# Patient Record
Sex: FEMALE | Race: WHITE | NOT HISPANIC OR LATINO | Employment: FULL TIME | URBAN - METROPOLITAN AREA
[De-identification: names, ages, dates, MRNs, and addresses within clinical notes are randomized per-mention and may not be internally consistent; named-entity substitution may affect disease eponyms.]

---

## 2022-09-28 ENCOUNTER — HOSPITAL ENCOUNTER (OUTPATIENT)
Dept: ULTRASOUND IMAGING | Facility: CLINIC | Age: 22
Discharge: HOME | End: 2022-09-28
Attending: OBSTETRICS & GYNECOLOGY | Admitting: OBSTETRICS & GYNECOLOGY
Payer: COMMERCIAL

## 2022-09-28 DIAGNOSIS — E04.9 ENLARGED THYROID: ICD-10-CM

## 2022-09-28 PROCEDURE — 76536 US EXAM OF HEAD AND NECK: CPT

## 2022-10-03 ENCOUNTER — HEALTH MAINTENANCE LETTER (OUTPATIENT)
Age: 22
End: 2022-10-03

## 2022-12-28 LAB
HEPATITIS B SURFACE ANTIGEN (EXTERNAL): NEGATIVE
HIV1+2 AB SERPL QL IA: NONREACTIVE
RUBELLA ANTIBODY IGG (EXTERNAL): NORMAL
TREPONEMA PALLIDUM ANTIBODY (EXTERNAL): NONREACTIVE

## 2023-03-16 ENCOUNTER — TRANSFERRED RECORDS (OUTPATIENT)
Dept: HEALTH INFORMATION MANAGEMENT | Facility: CLINIC | Age: 23
End: 2023-03-16
Payer: COMMERCIAL

## 2023-03-16 ENCOUNTER — MEDICAL CORRESPONDENCE (OUTPATIENT)
Dept: HEALTH INFORMATION MANAGEMENT | Facility: CLINIC | Age: 23
End: 2023-03-16
Payer: COMMERCIAL

## 2023-03-16 ENCOUNTER — TRANSCRIBE ORDERS (OUTPATIENT)
Dept: ULTRASOUND IMAGING | Facility: CLINIC | Age: 23
End: 2023-03-16
Payer: COMMERCIAL

## 2023-03-16 DIAGNOSIS — O26.90 PREGNANCY RELATED CONDITION, ANTEPARTUM: Primary | ICD-10-CM

## 2023-03-17 ENCOUNTER — PRE VISIT (OUTPATIENT)
Dept: MATERNAL FETAL MEDICINE | Facility: CLINIC | Age: 23
End: 2023-03-17
Payer: COMMERCIAL

## 2023-03-22 ENCOUNTER — HOSPITAL ENCOUNTER (OUTPATIENT)
Dept: ULTRASOUND IMAGING | Facility: CLINIC | Age: 23
Discharge: HOME | End: 2023-03-22
Attending: OBSTETRICS & GYNECOLOGY
Payer: COMMERCIAL

## 2023-03-22 ENCOUNTER — OFFICE VISIT (OUTPATIENT)
Dept: MATERNAL FETAL MEDICINE | Facility: CLINIC | Age: 23
End: 2023-03-22
Attending: OBSTETRICS & GYNECOLOGY
Payer: COMMERCIAL

## 2023-03-22 DIAGNOSIS — O99.212 OBESITY COMPLICATING PREGNANCY, SECOND TRIMESTER: Primary | ICD-10-CM

## 2023-03-22 DIAGNOSIS — O26.90 PREGNANCY RELATED CONDITION, ANTEPARTUM: ICD-10-CM

## 2023-03-22 PROCEDURE — 76811 OB US DETAILED SNGL FETUS: CPT

## 2023-03-22 PROCEDURE — 76811 OB US DETAILED SNGL FETUS: CPT | Mod: 26 | Performed by: OBSTETRICS & GYNECOLOGY

## 2023-03-22 NOTE — PROGRESS NOTES
Please see the imaging tab for details of the ultrasound performed today.    Elizabeth Ga MD  Specialist in Maternal-Fetal Medicine

## 2023-05-22 ENCOUNTER — HOSPITAL ENCOUNTER (INPATIENT)
Facility: CLINIC | Age: 23
LOS: 6 days | Discharge: HOME | End: 2023-05-28
Attending: SPECIALIST | Admitting: SPECIALIST
Payer: COMMERCIAL

## 2023-05-22 DIAGNOSIS — O14.23 HELLP SYNDROME (HELLP), THIRD TRIMESTER: ICD-10-CM

## 2023-05-22 PROBLEM — E66.9 OBESITY: Status: ACTIVE | Noted: 2023-05-22

## 2023-05-22 PROBLEM — O14.13 PREECLAMPSIA, SEVERE, THIRD TRIMESTER: Status: ACTIVE | Noted: 2023-05-22

## 2023-05-22 LAB
ABO/RH(D): NORMAL
ALBUMIN SERPL BCG-MCNC: 2.9 G/DL (ref 3.5–5.2)
ALP SERPL-CCNC: 141 U/L (ref 35–104)
ALT SERPL W P-5'-P-CCNC: 107 U/L (ref 10–35)
ALT SERPL W P-5'-P-CCNC: 82 U/L (ref 10–35)
ANION GAP SERPL CALCULATED.3IONS-SCNC: 9 MMOL/L (ref 7–15)
ANTIBODY SCREEN: NEGATIVE
AST SERPL W P-5'-P-CCNC: 51 U/L (ref 10–35)
AST SERPL W P-5'-P-CCNC: 62 U/L (ref 10–35)
AST SERPL W P-5'-P-CCNC: 70 U/L (ref 10–35)
BILIRUB SERPL-MCNC: 0.2 MG/DL
BUN SERPL-MCNC: 9.9 MG/DL (ref 6–20)
CALCIUM SERPL-MCNC: 8.6 MG/DL (ref 8.6–10)
CHLORIDE SERPL-SCNC: 106 MMOL/L (ref 98–107)
CREAT SERPL-MCNC: 0.81 MG/DL (ref 0.51–0.95)
DEPRECATED HCO3 PLAS-SCNC: 20 MMOL/L (ref 22–29)
ERYTHROCYTE [DISTWIDTH] IN BLOOD BY AUTOMATED COUNT: 13.9 % (ref 10–15)
GFR SERPL CREATININE-BSD FRML MDRD: >90 ML/MIN/1.73M2
GLUCOSE SERPL-MCNC: 102 MG/DL (ref 70–99)
HCT VFR BLD AUTO: 35.3 % (ref 35–47)
HGB BLD-MCNC: 12.3 G/DL (ref 11.7–15.7)
HGB BLD-MCNC: 13.1 G/DL (ref 11.7–15.7)
MCH RBC QN AUTO: 32 PG (ref 26.5–33)
MCHC RBC AUTO-ENTMCNC: 34.8 G/DL (ref 31.5–36.5)
MCV RBC AUTO: 92 FL (ref 78–100)
PLATELET # BLD AUTO: 304 10E3/UL (ref 150–450)
PLATELET # BLD AUTO: 315 10E3/UL (ref 150–450)
POTASSIUM SERPL-SCNC: 4.3 MMOL/L (ref 3.4–5.3)
PROT SERPL-MCNC: 5.8 G/DL (ref 6.4–8.3)
RBC # BLD AUTO: 3.84 10E6/UL (ref 3.8–5.2)
SODIUM SERPL-SCNC: 135 MMOL/L (ref 136–145)
SPECIMEN EXPIRATION DATE: NORMAL
WBC # BLD AUTO: 15.2 10E3/UL (ref 4–11)

## 2023-05-22 PROCEDURE — 250N000011 HC RX IP 250 OP 636

## 2023-05-22 PROCEDURE — 250N000013 HC RX MED GY IP 250 OP 250 PS 637

## 2023-05-22 PROCEDURE — 80053 COMPREHEN METABOLIC PANEL: CPT | Performed by: SPECIALIST

## 2023-05-22 PROCEDURE — 84156 ASSAY OF PROTEIN URINE: CPT | Performed by: SPECIALIST

## 2023-05-22 PROCEDURE — 86780 TREPONEMA PALLIDUM: CPT | Performed by: SPECIALIST

## 2023-05-22 PROCEDURE — 85018 HEMOGLOBIN: CPT | Performed by: SPECIALIST

## 2023-05-22 PROCEDURE — 99232 SBSQ HOSP IP/OBS MODERATE 35: CPT | Performed by: NURSE PRACTITIONER

## 2023-05-22 PROCEDURE — G0463 HOSPITAL OUTPT CLINIC VISIT: HCPCS | Mod: 25

## 2023-05-22 PROCEDURE — 87653 STREP B DNA AMP PROBE: CPT | Performed by: SPECIALIST

## 2023-05-22 PROCEDURE — 86850 RBC ANTIBODY SCREEN: CPT | Performed by: SPECIALIST

## 2023-05-22 PROCEDURE — 59025 FETAL NON-STRESS TEST: CPT

## 2023-05-22 PROCEDURE — 96372 THER/PROPH/DIAG INJ SC/IM: CPT | Performed by: SPECIALIST

## 2023-05-22 PROCEDURE — 250N000011 HC RX IP 250 OP 636: Performed by: SPECIALIST

## 2023-05-22 PROCEDURE — 84460 ALANINE AMINO (ALT) (SGPT): CPT | Performed by: SPECIALIST

## 2023-05-22 PROCEDURE — 250N000013 HC RX MED GY IP 250 OP 250 PS 637: Performed by: SPECIALIST

## 2023-05-22 PROCEDURE — 36415 COLL VENOUS BLD VENIPUNCTURE: CPT | Performed by: SPECIALIST

## 2023-05-22 PROCEDURE — 84450 TRANSFERASE (AST) (SGOT): CPT | Performed by: SPECIALIST

## 2023-05-22 PROCEDURE — 258N000003 HC RX IP 258 OP 636: Performed by: SPECIALIST

## 2023-05-22 PROCEDURE — 120N000001 HC R&B MED SURG/OB

## 2023-05-22 PROCEDURE — 96365 THER/PROPH/DIAG IV INF INIT: CPT

## 2023-05-22 PROCEDURE — 85027 COMPLETE CBC AUTOMATED: CPT | Performed by: SPECIALIST

## 2023-05-22 PROCEDURE — 85049 AUTOMATED PLATELET COUNT: CPT | Performed by: SPECIALIST

## 2023-05-22 PROCEDURE — 999N000105 HC STATISTIC NO DOCUMENTATION TO SUPPORT CHARGE

## 2023-05-22 RX ORDER — HYDROXYZINE HYDROCHLORIDE 50 MG/1
50 TABLET, FILM COATED ORAL DAILY PRN
Status: DISCONTINUED | OUTPATIENT
Start: 2023-05-22 | End: 2023-05-28 | Stop reason: HOSPADM

## 2023-05-22 RX ORDER — MAGNESIUM SULFATE HEPTAHYDRATE 40 MG/ML
INJECTION, SOLUTION INTRAVENOUS
Status: COMPLETED
Start: 2023-05-22 | End: 2023-05-22

## 2023-05-22 RX ORDER — OXYCODONE HYDROCHLORIDE 5 MG/1
5 TABLET ORAL EVERY 6 HOURS PRN
Status: DISCONTINUED | OUTPATIENT
Start: 2023-05-22 | End: 2023-05-23

## 2023-05-22 RX ORDER — NALOXONE HYDROCHLORIDE 0.4 MG/ML
0.2 INJECTION, SOLUTION INTRAMUSCULAR; INTRAVENOUS; SUBCUTANEOUS
Status: DISCONTINUED | OUTPATIENT
Start: 2023-05-22 | End: 2023-05-28 | Stop reason: HOSPADM

## 2023-05-22 RX ORDER — NIFEDIPINE 10 MG/1
10-20 CAPSULE ORAL
Status: DISCONTINUED | OUTPATIENT
Start: 2023-05-22 | End: 2023-05-28 | Stop reason: HOSPADM

## 2023-05-22 RX ORDER — LIDOCAINE 40 MG/G
CREAM TOPICAL
Status: DISCONTINUED | OUTPATIENT
Start: 2023-05-22 | End: 2023-05-28 | Stop reason: HOSPADM

## 2023-05-22 RX ORDER — MAGNESIUM SULFATE IN WATER 40 MG/ML
2 INJECTION, SOLUTION INTRAVENOUS CONTINUOUS
Status: DISCONTINUED | OUTPATIENT
Start: 2023-05-22 | End: 2023-05-28 | Stop reason: HOSPADM

## 2023-05-22 RX ORDER — MAGNESIUM SULFATE HEPTAHYDRATE 40 MG/ML
4 INJECTION, SOLUTION INTRAVENOUS ONCE
Status: COMPLETED | OUTPATIENT
Start: 2023-05-22 | End: 2023-05-22

## 2023-05-22 RX ORDER — LABETALOL HYDROCHLORIDE 5 MG/ML
20-80 INJECTION, SOLUTION INTRAVENOUS EVERY 10 MIN PRN
Status: DISCONTINUED | OUTPATIENT
Start: 2023-05-22 | End: 2023-05-28 | Stop reason: HOSPADM

## 2023-05-22 RX ORDER — HYDROXYZINE HYDROCHLORIDE 25 MG/1
25 TABLET, FILM COATED ORAL DAILY PRN
Status: DISCONTINUED | OUTPATIENT
Start: 2023-05-22 | End: 2023-05-28 | Stop reason: HOSPADM

## 2023-05-22 RX ORDER — ACETAMINOPHEN 325 MG/1
650 TABLET ORAL EVERY 6 HOURS PRN
Status: ON HOLD | COMMUNITY
End: 2023-05-28

## 2023-05-22 RX ORDER — CALCIUM GLUCONATE 94 MG/ML
1 INJECTION, SOLUTION INTRAVENOUS
Status: DISCONTINUED | OUTPATIENT
Start: 2023-05-22 | End: 2023-05-28 | Stop reason: HOSPADM

## 2023-05-22 RX ORDER — NIFEDIPINE 10 MG/1
CAPSULE ORAL
Status: COMPLETED
Start: 2023-05-22 | End: 2023-05-22

## 2023-05-22 RX ORDER — NALOXONE HYDROCHLORIDE 0.4 MG/ML
0.4 INJECTION, SOLUTION INTRAMUSCULAR; INTRAVENOUS; SUBCUTANEOUS
Status: DISCONTINUED | OUTPATIENT
Start: 2023-05-22 | End: 2023-05-28 | Stop reason: HOSPADM

## 2023-05-22 RX ORDER — PRENATAL VIT/IRON FUM/FOLIC AC 27MG-0.8MG
1 TABLET ORAL DAILY
COMMUNITY

## 2023-05-22 RX ORDER — SODIUM CHLORIDE, SODIUM LACTATE, POTASSIUM CHLORIDE, CALCIUM CHLORIDE 600; 310; 30; 20 MG/100ML; MG/100ML; MG/100ML; MG/100ML
INJECTION, SOLUTION INTRAVENOUS CONTINUOUS
Status: DISCONTINUED | OUTPATIENT
Start: 2023-05-22 | End: 2023-05-28 | Stop reason: HOSPADM

## 2023-05-22 RX ORDER — MAGNESIUM SULFATE HEPTAHYDRATE 40 MG/ML
2 INJECTION, SOLUTION INTRAVENOUS
Status: DISCONTINUED | OUTPATIENT
Start: 2023-05-22 | End: 2023-05-28 | Stop reason: HOSPADM

## 2023-05-22 RX ORDER — LABETALOL HYDROCHLORIDE 5 MG/ML
20 INJECTION, SOLUTION INTRAVENOUS
Status: DISCONTINUED | OUTPATIENT
Start: 2023-05-22 | End: 2023-05-28 | Stop reason: HOSPADM

## 2023-05-22 RX ORDER — MAGNESIUM SULFATE HEPTAHYDRATE 40 MG/ML
4 INJECTION, SOLUTION INTRAVENOUS
Status: DISCONTINUED | OUTPATIENT
Start: 2023-05-22 | End: 2023-05-28 | Stop reason: HOSPADM

## 2023-05-22 RX ORDER — NIFEDIPINE 30 MG/1
30 TABLET, EXTENDED RELEASE ORAL 3 TIMES DAILY
Status: DISCONTINUED | OUTPATIENT
Start: 2023-05-22 | End: 2023-05-23

## 2023-05-22 RX ORDER — NIFEDIPINE 30 MG/1
30 TABLET, EXTENDED RELEASE ORAL 2 TIMES DAILY
Status: DISCONTINUED | OUTPATIENT
Start: 2023-05-22 | End: 2023-05-22

## 2023-05-22 RX ORDER — BETAMETHASONE SODIUM PHOSPHATE AND BETAMETHASONE ACETATE 3; 3 MG/ML; MG/ML
12 INJECTION, SUSPENSION INTRA-ARTICULAR; INTRALESIONAL; INTRAMUSCULAR; SOFT TISSUE EVERY 24 HOURS
Status: COMPLETED | OUTPATIENT
Start: 2023-05-22 | End: 2023-05-23

## 2023-05-22 RX ORDER — LABETALOL HYDROCHLORIDE 5 MG/ML
INJECTION, SOLUTION INTRAVENOUS
Status: COMPLETED
Start: 2023-05-22 | End: 2023-05-22

## 2023-05-22 RX ADMIN — LABETALOL HYDROCHLORIDE 40 MG: 5 INJECTION, SOLUTION INTRAVENOUS at 15:31

## 2023-05-22 RX ADMIN — NIFEDIPINE 10 MG: 10 CAPSULE ORAL at 14:05

## 2023-05-22 RX ADMIN — MAGNESIUM SULFATE HEPTAHYDRATE 4 G: 40 INJECTION, SOLUTION INTRAVENOUS at 14:32

## 2023-05-22 RX ADMIN — NIFEDIPINE 20 MG: 10 CAPSULE ORAL at 14:47

## 2023-05-22 RX ADMIN — MAGNESIUM SULFATE IN WATER 2 G/HR: 40 INJECTION, SOLUTION INTRAVENOUS at 15:11

## 2023-05-22 RX ADMIN — LABETALOL HYDROCHLORIDE 20 MG: 5 INJECTION, SOLUTION INTRAVENOUS at 15:08

## 2023-05-22 RX ADMIN — SODIUM CHLORIDE, POTASSIUM CHLORIDE, SODIUM LACTATE AND CALCIUM CHLORIDE: 600; 310; 30; 20 INJECTION, SOLUTION INTRAVENOUS at 15:14

## 2023-05-22 RX ADMIN — LABETALOL HYDROCHLORIDE 20 MG: 5 INJECTION INTRAVENOUS at 15:08

## 2023-05-22 RX ADMIN — BETAMETHASONE SODIUM PHOSPHATE AND BETAMETHASONE ACETATE 12 MG: 3; 3 INJECTION, SUSPENSION INTRA-ARTICULAR; INTRALESIONAL; INTRAMUSCULAR at 14:48

## 2023-05-22 RX ADMIN — OXYCODONE HYDROCHLORIDE 5 MG: 5 TABLET ORAL at 16:17

## 2023-05-22 RX ADMIN — NIFEDIPINE 20 MG: 10 CAPSULE ORAL at 14:26

## 2023-05-22 RX ADMIN — OXYCODONE HYDROCHLORIDE 5 MG: 5 TABLET ORAL at 22:18

## 2023-05-22 RX ADMIN — NIFEDIPINE 30 MG: 30 TABLET, FILM COATED, EXTENDED RELEASE ORAL at 20:04

## 2023-05-22 ASSESSMENT — ACTIVITIES OF DAILY LIVING (ADL)
FALL_HISTORY_WITHIN_LAST_SIX_MONTHS: NO
TOILETING_ISSUES: NO
VISION_MANAGEMENT: GLASES
ADLS_ACUITY_SCORE: 20
DRESSING/BATHING_DIFFICULTY: NO
HEARING_DIFFICULTY_OR_DEAF: NO
ADLS_ACUITY_SCORE: 31
DOING_ERRANDS_INDEPENDENTLY_DIFFICULTY: NO
ADLS_ACUITY_SCORE: 20
CHANGE_IN_FUNCTIONAL_STATUS_SINCE_ONSET_OF_CURRENT_ILLNESS/INJURY: NO
DIFFICULTY_COMMUNICATING: NO
ADLS_ACUITY_SCORE: 31
WEAR_GLASSES_OR_BLIND: YES
CONCENTRATING,_REMEMBERING_OR_MAKING_DECISIONS_DIFFICULTY: NO
DIFFICULTY_EATING/SWALLOWING: NO
WALKING_OR_CLIMBING_STAIRS_DIFFICULTY: NO
ADLS_ACUITY_SCORE: 31

## 2023-05-22 NOTE — PLAN OF CARE
Goal Outcome Evaluation:         SBAR report received from Erika VIRK RN. Will assume all cares for this patient.

## 2023-05-22 NOTE — PROVIDER NOTIFICATION
05/22/23 1556   Provider Notification   Provider Name/Title Dr Hicks   Method of Notification In Department;At Bedside   Request Evaluate in Person   Notification Reason Status Update     Dr Hicks remained either at patients bedside, or at the desk in MAC to continuously assess patients BP and plan of care. Updates given throughout the entire time patient was in MAC regarding BPs and how patient responding to medications.

## 2023-05-22 NOTE — PLAN OF CARE
Goal Outcome Evaluation:         Updated  via telephone regarding lab/urine results. No new orders. Will continue with plan of care.

## 2023-05-22 NOTE — PLAN OF CARE
Goal Outcome Evaluation:                at 30 0/7 weeks gestation presents via wheelchair with Dr. Hicks from office with elevated blood pressure and headache. Patient started noticing a headache on Saturday, tried taking tylenol without any relief. Patient had noticed increased swelling in bilat lower legs, face and hands. External monitors applied after verbal consent by patient. Vital signs taken and set to cycle per protocol. Admission database obtained and prenatal record reviewed. Dr. Hicks at bedside for position ultrasound. Patient and spouse oriented to room, call light and plan of care.

## 2023-05-22 NOTE — H&P
"OB ADMISSION NOTE    CHIEF COMPLAINT: Brought over from clinic for /96.    HPI:  Presents to labor and delivery with elevated blood pressure.  Patient is a 24 yo  at 30+0 weeks presents to the office for headache X 3 days and rapid onset swelling of arms, legs and face. Her weight is up 15 pounds from her last visit. Her pregnancy is also complicated by obesity and anxiety.  She does not take medications for her anxiety.  She has not been vaccinated for COVID.      OBSTETRICAL / DATING HISTORY:  Estimated Date of Delivery: 2023  Gestational Age:  30w0d    OB History    Para Term  AB Living   1 0 0 0 0 0   SAB IAB Ectopic Multiple Live Births   0 0 0 0 0      # Outcome Date GA Lbr Jesse/2nd Weight Sex Delivery Anes PTL Lv   1 Current                 LAB TESTING:  See prenatal records.      PAST MEDICAL HISTORY:  Past Medical History:   Diagnosis Date     Depressive disorder     Anxiety       PAST SURGICAL HISTORY:  Past Surgical History:   Procedure Laterality Date     ENT SURGERY      [place] teeth       ALLERGIES:  Drysol (aluminim chloride)    HABITS:  No tobacco/etoh/drugs    HISTORY OF PRESENT ILLNESS:    (Please see scanned  sheets for prenatal history. Examination at the time of admission revealed no interval change in the patient s history or physical exam except as described below.)  See above     REVIEW OF SYSTEMS:  NEUROLOGIC:  Headache X 3 days.  No spots in vision or flashing lights  EYES:  Denies visual changes  ENT:  Negative  GI:  Negative  BREAST:  Negative  :  Negative  GYN:  Negative  CV:  Negative  PULMONARY:  Negative  MUSCULOSKELETAL:  Negative  PSYCH:  Negative  PHYSICAL EXAM:   BP (!) 229/131   Temp 99.4  F (37.4  C) (Temporal)   Ht 1.626 m (5' 4\")   Wt 118.8 kg (262 lb)   LMP 10/30/2022   BMI 44.97 kg/m    Gen:  Alert and oriented.  Face is puffy.  Anxious.  CV: RRR  RESP:  CTA bilaterally  ABDOMEN: gravid, nontender  Membrane Status: " Intact  Fetal Presentation: vertex by US  Cervix:  pending  GBS:  Not done  EFW:  3#    Fetal heart tones:  Baseline 140 with accelerations, moderate variability, category 1  TOCO: no contractions    Impression:  IUP at 30w0d with severe elevations in blood pressure.      Plan:  Patient received oral nifedipine upon arrival  Magnesium infusion started  First dose of betamethasone given.  Will await labs to determine immediate plan.    Johana Hicks MD ....................  5/22/2023   2:45 PM , pager: [phone]    40 minutes spent face-to face and another 90 minutes spent on care coordination, order, lab review, and consultation with HOLLY.

## 2023-05-22 NOTE — PROGRESS NOTES
Patient got 3 doses of Nifedipine (10, 20, 20 mg), then one dose of labetalol 20 mg IV.  Blood pressures are as follows:  218/111 (arrival), 218/114, 197/108, 191/96, 189/96, 185/99, 193/96, 185/98, 152/88, 163/89.    Labs:  Lab Results   Component Value Date    WBC 15.2 2023     Lab Results   Component Value Date    RBC 3.84 2023     Lab Results   Component Value Date    HGB 12.3 2023     Lab Results   Component Value Date    HCT 35.3 2023     No components found for: MCT  Lab Results   Component Value Date    MCV 92 2023     Lab Results   Component Value Date    MCH 32.0 2023     Lab Results   Component Value Date    MCHC 34.8 2023     Lab Results   Component Value Date    RDW 13.9 2023     Lab Results   Component Value Date     2023     AST 51 (hemoyzed)  ALT  82    EFM:  Category 1  [employer] [place]:  No contractions    Assessment:    22 yo  at 30+0 weeks with preE with severe features.    Isolated elevation of liver transaminases.      Plan:      Spoke with Dr. Ar Santos ([facility]).  Advised can hold off on induction until 24 hours after 2nd betamethasone as long as she does not max out of BP algorithm. Will start ripening tomorrow night or Wednesday.    Labs q 6 hours for 24 hours.    Oxycodone for headache.  If headache does not improve with oxycodone, that would be indication for delivery.    Start Nifedipine XL 30 mg BID.    Deliver for worsening fetal or maternal status.    Johana Hicks MD ....................  2023   4:12 PM , pager: [phone]

## 2023-05-23 LAB
ALBUMIN MFR UR ELPH: 2096 MG/DL (ref 1–14)
ALT SERPL W P-5'-P-CCNC: 118 U/L (ref 10–35)
ALT SERPL W P-5'-P-CCNC: 144 U/L (ref 10–35)
ALT SERPL W P-5'-P-CCNC: 168 U/L (ref 10–35)
ALT SERPL W P-5'-P-CCNC: 260 U/L (ref 10–35)
AST SERPL W P-5'-P-CCNC: 100 U/L (ref 10–35)
AST SERPL W P-5'-P-CCNC: 117 U/L (ref 10–35)
AST SERPL W P-5'-P-CCNC: 172 U/L (ref 10–35)
AST SERPL W P-5'-P-CCNC: 86 U/L (ref 10–35)
CREAT SERPL-MCNC: 0.9 MG/DL (ref 0.51–0.95)
CREAT UR-MCNC: 87.6 MG/DL
GFR SERPL CREATININE-BSD FRML MDRD: >90 ML/MIN/1.73M2
GP B STREP DNA SPEC QL NAA+PROBE: NEGATIVE
HGB BLD-MCNC: 11.3 G/DL (ref 11.7–15.7)
HGB BLD-MCNC: 11.5 G/DL (ref 11.7–15.7)
HGB BLD-MCNC: 12.2 G/DL (ref 11.7–15.7)
HGB BLD-MCNC: 12.3 G/DL (ref 11.7–15.7)
PLATELET # BLD AUTO: 258 10E3/UL (ref 150–450)
PLATELET # BLD AUTO: 265 10E3/UL (ref 150–450)
PLATELET # BLD AUTO: 270 10E3/UL (ref 150–450)
PLATELET # BLD AUTO: 281 10E3/UL (ref 150–450)
PROT/CREAT 24H UR: 23.93 MG/MG CR (ref 0–0.2)
T PALLIDUM AB SER QL: NONREACTIVE

## 2023-05-23 PROCEDURE — 999N000128 HC STATISTIC PERIPHERAL IV START W/O US GUIDANCE

## 2023-05-23 PROCEDURE — 84450 TRANSFERASE (AST) (SGOT): CPT | Performed by: SPECIALIST

## 2023-05-23 PROCEDURE — 84460 ALANINE AMINO (ALT) (SGPT): CPT | Performed by: SPECIALIST

## 2023-05-23 PROCEDURE — 36415 COLL VENOUS BLD VENIPUNCTURE: CPT | Performed by: SPECIALIST

## 2023-05-23 PROCEDURE — 85018 HEMOGLOBIN: CPT | Performed by: SPECIALIST

## 2023-05-23 PROCEDURE — 82565 ASSAY OF CREATININE: CPT | Performed by: SPECIALIST

## 2023-05-23 PROCEDURE — 85049 AUTOMATED PLATELET COUNT: CPT | Performed by: SPECIALIST

## 2023-05-23 PROCEDURE — 250N000013 HC RX MED GY IP 250 OP 250 PS 637: Performed by: SPECIALIST

## 2023-05-23 PROCEDURE — 250N000011 HC RX IP 250 OP 636: Performed by: SPECIALIST

## 2023-05-23 PROCEDURE — 120N000001 HC R&B MED SURG/OB

## 2023-05-23 PROCEDURE — 258N000003 HC RX IP 258 OP 636: Performed by: SPECIALIST

## 2023-05-23 RX ORDER — LABETALOL HYDROCHLORIDE 5 MG/ML
20-40 INJECTION, SOLUTION INTRAVENOUS EVERY 10 MIN PRN
Status: DISCONTINUED | OUTPATIENT
Start: 2023-05-23 | End: 2023-05-23

## 2023-05-23 RX ORDER — OXYCODONE HYDROCHLORIDE 5 MG/1
5 TABLET ORAL EVERY 4 HOURS PRN
Status: DISCONTINUED | OUTPATIENT
Start: 2023-05-23 | End: 2023-05-28 | Stop reason: HOSPADM

## 2023-05-23 RX ORDER — NIFEDIPINE 30 MG/1
60 TABLET, EXTENDED RELEASE ORAL EVERY 12 HOURS
Status: DISCONTINUED | OUTPATIENT
Start: 2023-05-23 | End: 2023-05-28 | Stop reason: HOSPADM

## 2023-05-23 RX ORDER — LABETALOL 200 MG/1
200 TABLET, FILM COATED ORAL EVERY 8 HOURS SCHEDULED
Status: DISCONTINUED | OUTPATIENT
Start: 2023-05-23 | End: 2023-05-26

## 2023-05-23 RX ORDER — HYDRALAZINE HYDROCHLORIDE 20 MG/ML
10 INJECTION INTRAMUSCULAR; INTRAVENOUS
Status: DISCONTINUED | OUTPATIENT
Start: 2023-05-23 | End: 2023-05-28 | Stop reason: HOSPADM

## 2023-05-23 RX ADMIN — MAGNESIUM SULFATE IN WATER 2 G/HR: 40 INJECTION, SOLUTION INTRAVENOUS at 01:03

## 2023-05-23 RX ADMIN — OXYCODONE HYDROCHLORIDE 5 MG: 5 TABLET ORAL at 03:43

## 2023-05-23 RX ADMIN — BETAMETHASONE SODIUM PHOSPHATE AND BETAMETHASONE ACETATE 12 MG: 3; 3 INJECTION, SUSPENSION INTRA-ARTICULAR; INTRALESIONAL; INTRAMUSCULAR at 14:50

## 2023-05-23 RX ADMIN — LABETALOL HYDROCHLORIDE 200 MG: 200 TABLET, FILM COATED ORAL at 14:03

## 2023-05-23 RX ADMIN — LABETALOL HYDROCHLORIDE 200 MG: 200 TABLET, FILM COATED ORAL at 21:55

## 2023-05-23 RX ADMIN — NIFEDIPINE 60 MG: 60 TABLET, FILM COATED, EXTENDED RELEASE ORAL at 20:28

## 2023-05-23 RX ADMIN — LABETALOL HYDROCHLORIDE 40 MG: 5 INJECTION, SOLUTION INTRAVENOUS at 03:38

## 2023-05-23 RX ADMIN — MAGNESIUM SULFATE IN WATER 2 G/HR: 40 INJECTION, SOLUTION INTRAVENOUS at 10:36

## 2023-05-23 RX ADMIN — NIFEDIPINE 60 MG: 60 TABLET, FILM COATED, EXTENDED RELEASE ORAL at 08:28

## 2023-05-23 RX ADMIN — LABETALOL HYDROCHLORIDE 20 MG: 5 INJECTION, SOLUTION INTRAVENOUS at 03:23

## 2023-05-23 RX ADMIN — MAGNESIUM SULFATE IN WATER 2 G/HR: 40 INJECTION, SOLUTION INTRAVENOUS at 19:28

## 2023-05-23 RX ADMIN — OXYCODONE HYDROCHLORIDE 5 MG: 5 TABLET ORAL at 13:13

## 2023-05-23 RX ADMIN — LABETALOL HYDROCHLORIDE 20 MG: 5 INJECTION, SOLUTION INTRAVENOUS at 01:15

## 2023-05-23 RX ADMIN — LABETALOL HYDROCHLORIDE 20 MG: 5 INJECTION, SOLUTION INTRAVENOUS at 00:13

## 2023-05-23 RX ADMIN — LABETALOL HYDROCHLORIDE 200 MG: 200 TABLET, FILM COATED ORAL at 07:08

## 2023-05-23 RX ADMIN — LABETALOL HYDROCHLORIDE 40 MG: 5 INJECTION, SOLUTION INTRAVENOUS at 01:31

## 2023-05-23 RX ADMIN — SODIUM CHLORIDE, POTASSIUM CHLORIDE, SODIUM LACTATE AND CALCIUM CHLORIDE: 600; 310; 30; 20 INJECTION, SOLUTION INTRAVENOUS at 01:05

## 2023-05-23 ASSESSMENT — ACTIVITIES OF DAILY LIVING (ADL)
ADLS_ACUITY_SCORE: 20

## 2023-05-23 NOTE — PROVIDER NOTIFICATION
05/23/23 0251   Provider Notification   Provider Name/Title Dr. Hicks   Method of Notification Phone     Dr. Hicks called and update on but not limited to Lab results, BP trends and persistent headache. MD updated orders. Will continue with labetalol protocol and reassess with 0600 labs.

## 2023-05-23 NOTE — PROVIDER NOTIFICATION
23 0645   Provider Notification   Provider Name/Title Dr. Smith   Method of Notification At Bedside   Request Evaluate in Person   Dr. Barros at bedside to evaluate patient. Discussed new lab results and BP medication regimen. New orders in place for BP medications. Possible plan to move forward with  today.

## 2023-05-23 NOTE — PROVIDER NOTIFICATION
05/23/23 0152   Provider Notification   Provider Name/Title Dr. Hicks   Method of Notification Phone   Request Evaluate - Remote   Notification Reason Maternal Vital Sign Change     Dr. Hicks updated on but limited to pt's blood pressure and having to treat with labetalol x3. Pt is reporting persistent headache. Will draw labs early and update MD.

## 2023-05-23 NOTE — PROGRESS NOTES
"ANTEPARTUM PROGRESS NOTE    Intrauterine Pregnancy at 30w1d weeks gestation, , with severe preeclampsia3    Subjective:   The patient feels poorly.  She denies new complaints.   She has fetal movement.  She denies contractions, cramping, pelvic pressure, backache. She denies vaginal bleeding.  Extremities- moderate swelling.   Tolerating magnesium sulfate.   Slept poorly   Complaint of mild headaches since last night.       Intake/Output Summary (Last 24 hours) at 2023 0655  Last data filed at 2023 0600  Gross per 24 hour   Intake 1918 ml   Output 1075 ml   Net 843 ml     Past Medical History:   Diagnosis Date     Depressive disorder     Anxiety     Past Surgical History:   Procedure Laterality Date     ENT SURGERY      [place] teeth       Objective:   BP (!) 147/81 (BP Location: Left arm, Patient Position: Semi-Urrutia's)   Temp 97.9  F (36.6  C) (Oral)   Resp 20   Ht 1.626 m (5' 4\")   Wt 118.8 kg (262 lb)   LMP 10/30/2022   SpO2 96%   BMI 44.97 kg/m      GENERAL APPEARANCE: flushed    ABDOMEN:  Abdomen soft, non-tender. BS normal. No masses, organomegaly,Fetal movement present.  EXTREMITIES: legs Normal and 2+ edema  NST:  Reassuring for gestational age      Assessment:  Principal Problem:    Indication for care in labor and delivery, antepartum  Active Problems:    Preeclampsia, severe, third trimester    Obesity     Labs:   ( 102, 118, 144)              ( 62, 70, 86, 120)             Hgb 12.3                           Total Protein 2,096 mg/dl  23 year old 30w1d,  , with Indication for care in labor and delivery, antepartum    Plan:   Will increase Nifedipine to 60 mg XL bid , add labetalol 200 mg q8h  Keep NPO  Possible delivery later today.  "

## 2023-05-23 NOTE — PROVIDER NOTIFICATION
05/22/23 2132   Provider Notification   Provider Name/Title Dr. Hicks   Method of Notification Electronic Page   Notification Reason Lab/Diagnostic Study;Maternal Vital Sign Change     Dr. Hicks update on but not limited to lab results and BP has increase since last update. Has not been treatable yet. Will continue to follow blood pressure guideline. Will update on 0230 labs results

## 2023-05-23 NOTE — PLAN OF CARE
Lab at bedside, venipuncture x2, unable to get blood.  3rd lab person called to room.  Dr Barros electronically paged and aware of difficulty getting labs drawn.  Will continue to monitor and assess.

## 2023-05-23 NOTE — PLAN OF CARE
Lab at bedside, unable to draw labs.  New lab person called to attempt to draw labs. Pt tolerated well.

## 2023-05-23 NOTE — CONSULTS
I was asked to provide antepartum counseling for Ifeoma Parisi at the request of Johana Hicks MD because of elevated blood pressure/preeclampsia with severe features at 30w0d gestation. Ifeoma, accompanied by her spouse, Jesus and his parents, was counseled on the expected hospital course, potential risks, and outcomes associated with infants born at approximately 30 weeks gestation.     The counseling included: initial delivery room stabilization, respiratory course, lung development, respiratory management including surfactant administration, apnea and bradycardia of prematurity, IVH, at risk for infection, nutrition including initial IV fluids and transition to gavage feedings then breast or bottle feeding, growth and development, and long term outcomes.     Please feel free to call with any additional questions or concerns.    Floor Time (min): 10  Face to Face Time (min): 30  Total Time (minutes): 40  More than 50% of my time was spent in direct, face to face, antepartum counseling with the above patient.      Ana M Coburn, APRN, CNP   Advanced Practice Service

## 2023-05-23 NOTE — PLAN OF CARE
Goal Outcome Evaluation:               SBRA report to Cassandra GODINEZ RN to assume all cares for this patient.

## 2023-05-23 NOTE — PLAN OF CARE
Pt coping well with plan to have c/s tomorrow afternoon.  Pt requesting to eat, regular diet ordered by Dr Barros.  Vss, afebrile.  Pt states H/A mild.  DTR's +3/no clonus.  Lung sounds clear and equal.  Pt denies SOB, blurred vision or epigastric pain.  Labs ordered for 8pm.  Discussed with pt importance of visitors leaving so she could rest.  Pt and family verbalized understanding.  Will continue to monitor and assess.

## 2023-05-23 NOTE — PROGRESS NOTES
Progress Note  Reviewed labs and clinical scenario with HOLLY Garrett.  Will attempt to wait for delivery until 24 hours after second betamethasone.   Will repeat labs at 8 pm.   Ok to eat now.  NPO after midnight.   Will continue to follow , consider delivery today if requiring more break thru antihypertensive doses or change in neurologic status.      Tonie Barros MD

## 2023-05-23 NOTE — PLAN OF CARE
Writer assumed care from ESDRAS Trujillo at 0715.  Pt currently sleeping between cares, appears comfortable. Vss, afebrile.  DTR's +3, no clonus.  FHR appropriate for gestational age.  Pt denies feeling ctx, bleeding or leaking fluid.  Will continue to monitor and assess.

## 2023-05-24 ENCOUNTER — ANESTHESIA EVENT (OUTPATIENT)
Dept: OBGYN | Facility: CLINIC | Age: 23
End: 2023-05-24
Payer: COMMERCIAL

## 2023-05-24 ENCOUNTER — ANESTHESIA (OUTPATIENT)
Dept: OBGYN | Facility: CLINIC | Age: 23
End: 2023-05-24
Payer: COMMERCIAL

## 2023-05-24 LAB
ALT SERPL W P-5'-P-CCNC: 282 U/L (ref 10–35)
ALT SERPL W P-5'-P-CCNC: 351 U/L (ref 10–35)
AST SERPL W P-5'-P-CCNC: 191 U/L (ref 10–35)
AST SERPL W P-5'-P-CCNC: 210 U/L (ref 10–35)
CREAT SERPL-MCNC: 0.82 MG/DL (ref 0.51–0.95)
CREAT SERPL-MCNC: 0.89 MG/DL (ref 0.51–0.95)
ERYTHROCYTE [DISTWIDTH] IN BLOOD BY AUTOMATED COUNT: 14 % (ref 10–15)
ERYTHROCYTE [DISTWIDTH] IN BLOOD BY AUTOMATED COUNT: 14.1 % (ref 10–15)
GFR SERPL CREATININE-BSD FRML MDRD: >90 ML/MIN/1.73M2
GFR SERPL CREATININE-BSD FRML MDRD: >90 ML/MIN/1.73M2
HCT VFR BLD AUTO: 32.1 % (ref 35–47)
HCT VFR BLD AUTO: 33.4 % (ref 35–47)
HGB BLD-MCNC: 11.2 G/DL (ref 11.7–15.7)
HGB BLD-MCNC: 11.7 G/DL (ref 11.7–15.7)
HOLD SPECIMEN: NORMAL
MCH RBC QN AUTO: 31.8 PG (ref 26.5–33)
MCH RBC QN AUTO: 31.8 PG (ref 26.5–33)
MCHC RBC AUTO-ENTMCNC: 34.9 G/DL (ref 31.5–36.5)
MCHC RBC AUTO-ENTMCNC: 35 G/DL (ref 31.5–36.5)
MCV RBC AUTO: 91 FL (ref 78–100)
MCV RBC AUTO: 91 FL (ref 78–100)
PLATELET # BLD AUTO: 270 10E3/UL (ref 150–450)
PLATELET # BLD AUTO: 275 10E3/UL (ref 150–450)
RBC # BLD AUTO: 3.52 10E6/UL (ref 3.8–5.2)
RBC # BLD AUTO: 3.68 10E6/UL (ref 3.8–5.2)
WBC # BLD AUTO: 18.8 10E3/UL (ref 4–11)
WBC # BLD AUTO: 18.9 10E3/UL (ref 4–11)

## 2023-05-24 PROCEDURE — 250N000011 HC RX IP 250 OP 636: Performed by: OBSTETRICS & GYNECOLOGY

## 2023-05-24 PROCEDURE — 250N000009 HC RX 250: Performed by: OBSTETRICS & GYNECOLOGY

## 2023-05-24 PROCEDURE — 250N000009 HC RX 250: Performed by: NURSE ANESTHETIST, CERTIFIED REGISTERED

## 2023-05-24 PROCEDURE — 85027 COMPLETE CBC AUTOMATED: CPT | Performed by: SPECIALIST

## 2023-05-24 PROCEDURE — 84460 ALANINE AMINO (ALT) (SGPT): CPT | Performed by: SPECIALIST

## 2023-05-24 PROCEDURE — 82565 ASSAY OF CREATININE: CPT | Performed by: SPECIALIST

## 2023-05-24 PROCEDURE — 88307 TISSUE EXAM BY PATHOLOGIST: CPT | Mod: TC | Performed by: OBSTETRICS & GYNECOLOGY

## 2023-05-24 PROCEDURE — 370N000017 HC ANESTHESIA TECHNICAL FEE, PER MIN: Performed by: OBSTETRICS & GYNECOLOGY

## 2023-05-24 PROCEDURE — 250N000011 HC RX IP 250 OP 636: Performed by: NURSE ANESTHETIST, CERTIFIED REGISTERED

## 2023-05-24 PROCEDURE — 36569 INSJ PICC 5 YR+ W/O IMAGING: CPT

## 2023-05-24 PROCEDURE — 250N000013 HC RX MED GY IP 250 OP 250 PS 637: Performed by: SPECIALIST

## 2023-05-24 PROCEDURE — 84450 TRANSFERASE (AST) (SGOT): CPT | Performed by: SPECIALIST

## 2023-05-24 PROCEDURE — 250N000013 HC RX MED GY IP 250 OP 250 PS 637: Performed by: OBSTETRICS & GYNECOLOGY

## 2023-05-24 PROCEDURE — 120N000012 HC R&B POSTPARTUM

## 2023-05-24 PROCEDURE — 360N000076 HC SURGERY LEVEL 3, PER MIN: Performed by: OBSTETRICS & GYNECOLOGY

## 2023-05-24 PROCEDURE — 272N000001 HC OR GENERAL SUPPLY STERILE: Performed by: OBSTETRICS & GYNECOLOGY

## 2023-05-24 PROCEDURE — 250N000011 HC RX IP 250 OP 636: Performed by: SPECIALIST

## 2023-05-24 PROCEDURE — 88307 TISSUE EXAM BY PATHOLOGIST: CPT | Mod: 26 | Performed by: STUDENT IN AN ORGANIZED HEALTH CARE EDUCATION/TRAINING PROGRAM

## 2023-05-24 PROCEDURE — 272N000451 HC KIT SHRLOCK 5FR POWER PICC DOUBLE LUMEN

## 2023-05-24 PROCEDURE — 36415 COLL VENOUS BLD VENIPUNCTURE: CPT | Performed by: SPECIALIST

## 2023-05-24 PROCEDURE — 250N000009 HC RX 250: Performed by: SPECIALIST

## 2023-05-24 PROCEDURE — 258N000003 HC RX IP 258 OP 636: Performed by: NURSE ANESTHETIST, CERTIFIED REGISTERED

## 2023-05-24 PROCEDURE — 258N000003 HC RX IP 258 OP 636: Performed by: SPECIALIST

## 2023-05-24 PROCEDURE — 250N000013 HC RX MED GY IP 250 OP 250 PS 637: Performed by: NURSE ANESTHETIST, CERTIFIED REGISTERED

## 2023-05-24 PROCEDURE — 710N000010 HC RECOVERY PHASE 1, LEVEL 2, PER MIN: Performed by: OBSTETRICS & GYNECOLOGY

## 2023-05-24 RX ORDER — NALOXONE HYDROCHLORIDE 0.4 MG/ML
0.4 INJECTION, SOLUTION INTRAMUSCULAR; INTRAVENOUS; SUBCUTANEOUS
Status: DISCONTINUED | OUTPATIENT
Start: 2023-05-24 | End: 2023-05-24

## 2023-05-24 RX ORDER — OXYTOCIN 10 [USP'U]/ML
10 INJECTION, SOLUTION INTRAMUSCULAR; INTRAVENOUS
Status: DISCONTINUED | OUTPATIENT
Start: 2023-05-24 | End: 2023-05-28 | Stop reason: HOSPADM

## 2023-05-24 RX ORDER — ONDANSETRON 2 MG/ML
INJECTION INTRAMUSCULAR; INTRAVENOUS PRN
Status: DISCONTINUED | OUTPATIENT
Start: 2023-05-24 | End: 2023-05-24

## 2023-05-24 RX ORDER — LIDOCAINE 40 MG/G
CREAM TOPICAL
Status: DISCONTINUED | OUTPATIENT
Start: 2023-05-24 | End: 2023-05-24

## 2023-05-24 RX ORDER — AMOXICILLIN 250 MG
2 CAPSULE ORAL 2 TIMES DAILY
Status: DISCONTINUED | OUTPATIENT
Start: 2023-05-24 | End: 2023-05-28 | Stop reason: HOSPADM

## 2023-05-24 RX ORDER — ACETAMINOPHEN 325 MG/1
975 TABLET ORAL ONCE
Status: DISCONTINUED | OUTPATIENT
Start: 2023-05-24 | End: 2023-05-24

## 2023-05-24 RX ORDER — NALOXONE HYDROCHLORIDE 0.4 MG/ML
0.2 INJECTION, SOLUTION INTRAMUSCULAR; INTRAVENOUS; SUBCUTANEOUS
Status: DISCONTINUED | OUTPATIENT
Start: 2023-05-24 | End: 2023-05-24

## 2023-05-24 RX ORDER — DEXTROSE, SODIUM CHLORIDE, SODIUM LACTATE, POTASSIUM CHLORIDE, AND CALCIUM CHLORIDE 5; .6; .31; .03; .02 G/100ML; G/100ML; G/100ML; G/100ML; G/100ML
INJECTION, SOLUTION INTRAVENOUS CONTINUOUS
Status: DISCONTINUED | OUTPATIENT
Start: 2023-05-24 | End: 2023-05-28 | Stop reason: HOSPADM

## 2023-05-24 RX ORDER — MODIFIED LANOLIN
OINTMENT (GRAM) TOPICAL
Status: DISCONTINUED | OUTPATIENT
Start: 2023-05-24 | End: 2023-05-28 | Stop reason: HOSPADM

## 2023-05-24 RX ORDER — AMOXICILLIN 250 MG
1 CAPSULE ORAL 2 TIMES DAILY
Status: DISCONTINUED | OUTPATIENT
Start: 2023-05-24 | End: 2023-05-28 | Stop reason: HOSPADM

## 2023-05-24 RX ORDER — MISOPROSTOL 200 UG/1
TABLET ORAL PRN
Status: DISCONTINUED | OUTPATIENT
Start: 2023-05-24 | End: 2023-05-24

## 2023-05-24 RX ORDER — CEFAZOLIN SODIUM/WATER 3 G/30 ML
SYRINGE (ML) INTRAVENOUS
Status: DISCONTINUED
Start: 2023-05-24 | End: 2023-05-24 | Stop reason: HOSPADM

## 2023-05-24 RX ORDER — LIDOCAINE 40 MG/G
CREAM TOPICAL
Status: DISCONTINUED | OUTPATIENT
Start: 2023-05-24 | End: 2023-05-28 | Stop reason: HOSPADM

## 2023-05-24 RX ORDER — KETOROLAC TROMETHAMINE 30 MG/ML
30 INJECTION, SOLUTION INTRAMUSCULAR; INTRAVENOUS EVERY 6 HOURS
Status: COMPLETED | OUTPATIENT
Start: 2023-05-24 | End: 2023-05-25

## 2023-05-24 RX ORDER — BISACODYL 10 MG
10 SUPPOSITORY, RECTAL RECTAL DAILY PRN
Status: DISCONTINUED | OUTPATIENT
Start: 2023-05-26 | End: 2023-05-28 | Stop reason: HOSPADM

## 2023-05-24 RX ORDER — HYDROCORTISONE 25 MG/G
CREAM TOPICAL 3 TIMES DAILY PRN
Status: DISCONTINUED | OUTPATIENT
Start: 2023-05-24 | End: 2023-05-28 | Stop reason: HOSPADM

## 2023-05-24 RX ORDER — FUROSEMIDE 20 MG
40 TABLET ORAL ONCE
Status: COMPLETED | OUTPATIENT
Start: 2023-05-24 | End: 2023-05-24

## 2023-05-24 RX ORDER — DIPHENHYDRAMINE HCL 25 MG
25 CAPSULE ORAL EVERY 6 HOURS PRN
Status: DISCONTINUED | OUTPATIENT
Start: 2023-05-24 | End: 2023-05-28 | Stop reason: HOSPADM

## 2023-05-24 RX ORDER — ONDANSETRON 4 MG/1
4 TABLET, ORALLY DISINTEGRATING ORAL EVERY 6 HOURS PRN
Status: DISCONTINUED | OUTPATIENT
Start: 2023-05-24 | End: 2023-05-28 | Stop reason: HOSPADM

## 2023-05-24 RX ORDER — MISOPROSTOL 200 UG/1
800 TABLET ORAL
Status: DISCONTINUED | OUTPATIENT
Start: 2023-05-24 | End: 2023-05-24 | Stop reason: HOSPADM

## 2023-05-24 RX ORDER — CARBOPROST TROMETHAMINE 250 UG/ML
250 INJECTION, SOLUTION INTRAMUSCULAR
Status: DISCONTINUED | OUTPATIENT
Start: 2023-05-24 | End: 2023-05-24 | Stop reason: HOSPADM

## 2023-05-24 RX ORDER — MISOPROSTOL 200 UG/1
800 TABLET ORAL
Status: DISCONTINUED | OUTPATIENT
Start: 2023-05-24 | End: 2023-05-28 | Stop reason: HOSPADM

## 2023-05-24 RX ORDER — SODIUM CHLORIDE, SODIUM LACTATE, POTASSIUM CHLORIDE, CALCIUM CHLORIDE 600; 310; 30; 20 MG/100ML; MG/100ML; MG/100ML; MG/100ML
INJECTION, SOLUTION INTRAVENOUS CONTINUOUS PRN
Status: DISCONTINUED | OUTPATIENT
Start: 2023-05-24 | End: 2023-05-24

## 2023-05-24 RX ORDER — SIMETHICONE 80 MG
80 TABLET,CHEWABLE ORAL 4 TIMES DAILY PRN
Status: DISCONTINUED | OUTPATIENT
Start: 2023-05-24 | End: 2023-05-28 | Stop reason: HOSPADM

## 2023-05-24 RX ORDER — LIDOCAINE 40 MG/G
CREAM TOPICAL
Status: DISCONTINUED | OUTPATIENT
Start: 2023-05-24 | End: 2023-05-24 | Stop reason: HOSPADM

## 2023-05-24 RX ORDER — FENTANYL CITRATE 50 UG/ML
INJECTION, SOLUTION INTRAMUSCULAR; INTRAVENOUS PRN
Status: DISCONTINUED | OUTPATIENT
Start: 2023-05-24 | End: 2023-05-24

## 2023-05-24 RX ORDER — PROCHLORPERAZINE MALEATE 10 MG
10 TABLET ORAL EVERY 6 HOURS PRN
Status: DISCONTINUED | OUTPATIENT
Start: 2023-05-24 | End: 2023-05-28 | Stop reason: HOSPADM

## 2023-05-24 RX ORDER — MISOPROSTOL 200 UG/1
400 TABLET ORAL
Status: DISCONTINUED | OUTPATIENT
Start: 2023-05-24 | End: 2023-05-28 | Stop reason: HOSPADM

## 2023-05-24 RX ORDER — METOCLOPRAMIDE HYDROCHLORIDE 5 MG/ML
10 INJECTION INTRAMUSCULAR; INTRAVENOUS EVERY 6 HOURS PRN
Status: DISCONTINUED | OUTPATIENT
Start: 2023-05-24 | End: 2023-05-28 | Stop reason: HOSPADM

## 2023-05-24 RX ORDER — SODIUM CHLORIDE, SODIUM LACTATE, POTASSIUM CHLORIDE, CALCIUM CHLORIDE 600; 310; 30; 20 MG/100ML; MG/100ML; MG/100ML; MG/100ML
INJECTION, SOLUTION INTRAVENOUS CONTINUOUS
Status: DISCONTINUED | OUTPATIENT
Start: 2023-05-24 | End: 2023-05-24 | Stop reason: HOSPADM

## 2023-05-24 RX ORDER — ONDANSETRON 2 MG/ML
4 INJECTION INTRAMUSCULAR; INTRAVENOUS EVERY 6 HOURS PRN
Status: DISCONTINUED | OUTPATIENT
Start: 2023-05-24 | End: 2023-05-28 | Stop reason: HOSPADM

## 2023-05-24 RX ORDER — OXYTOCIN/0.9 % SODIUM CHLORIDE 30/500 ML
340 PLASTIC BAG, INJECTION (ML) INTRAVENOUS CONTINUOUS PRN
Status: DISCONTINUED | OUTPATIENT
Start: 2023-05-24 | End: 2023-05-24 | Stop reason: HOSPADM

## 2023-05-24 RX ORDER — TRANEXAMIC ACID 10 MG/ML
1 INJECTION, SOLUTION INTRAVENOUS EVERY 30 MIN PRN
Status: DISCONTINUED | OUTPATIENT
Start: 2023-05-24 | End: 2023-05-24 | Stop reason: HOSPADM

## 2023-05-24 RX ORDER — CEFAZOLIN SODIUM/WATER 3 G/30 ML
3 SYRINGE (ML) INTRAVENOUS SEE ADMIN INSTRUCTIONS
Status: DISCONTINUED | OUTPATIENT
Start: 2023-05-24 | End: 2023-05-24 | Stop reason: HOSPADM

## 2023-05-24 RX ORDER — CARBOPROST TROMETHAMINE 250 UG/ML
250 INJECTION, SOLUTION INTRAMUSCULAR
Status: DISCONTINUED | OUTPATIENT
Start: 2023-05-24 | End: 2023-05-28 | Stop reason: HOSPADM

## 2023-05-24 RX ORDER — HEPARIN SODIUM 5000 [USP'U]/.5ML
5000 INJECTION, SOLUTION INTRAVENOUS; SUBCUTANEOUS EVERY 12 HOURS
Status: DISCONTINUED | OUTPATIENT
Start: 2023-05-25 | End: 2023-05-28 | Stop reason: HOSPADM

## 2023-05-24 RX ORDER — IBUPROFEN 400 MG/1
800 TABLET, FILM COATED ORAL EVERY 6 HOURS PRN
Status: DISCONTINUED | OUTPATIENT
Start: 2023-05-25 | End: 2023-05-28 | Stop reason: HOSPADM

## 2023-05-24 RX ORDER — OXYTOCIN/0.9 % SODIUM CHLORIDE 30/500 ML
PLASTIC BAG, INJECTION (ML) INTRAVENOUS CONTINUOUS PRN
Status: DISCONTINUED | OUTPATIENT
Start: 2023-05-24 | End: 2023-05-24

## 2023-05-24 RX ORDER — PROCHLORPERAZINE 25 MG
25 SUPPOSITORY, RECTAL RECTAL EVERY 12 HOURS PRN
Status: DISCONTINUED | OUTPATIENT
Start: 2023-05-24 | End: 2023-05-28 | Stop reason: HOSPADM

## 2023-05-24 RX ORDER — OXYTOCIN 10 [USP'U]/ML
10 INJECTION, SOLUTION INTRAMUSCULAR; INTRAVENOUS
Status: DISCONTINUED | OUTPATIENT
Start: 2023-05-24 | End: 2023-05-24 | Stop reason: HOSPADM

## 2023-05-24 RX ORDER — OXYTOCIN/0.9 % SODIUM CHLORIDE 30/500 ML
100-340 PLASTIC BAG, INJECTION (ML) INTRAVENOUS CONTINUOUS PRN
Status: DISCONTINUED | OUTPATIENT
Start: 2023-05-24 | End: 2023-05-28 | Stop reason: HOSPADM

## 2023-05-24 RX ORDER — DIPHENHYDRAMINE HYDROCHLORIDE 50 MG/ML
12.5 INJECTION INTRAMUSCULAR; INTRAVENOUS EVERY 6 HOURS PRN
Status: DISCONTINUED | OUTPATIENT
Start: 2023-05-24 | End: 2023-05-28 | Stop reason: HOSPADM

## 2023-05-24 RX ORDER — FUROSEMIDE 20 MG
20 TABLET ORAL
Status: DISCONTINUED | OUTPATIENT
Start: 2023-05-25 | End: 2023-05-28 | Stop reason: HOSPADM

## 2023-05-24 RX ORDER — METOCLOPRAMIDE 10 MG/1
10 TABLET ORAL EVERY 6 HOURS PRN
Status: DISCONTINUED | OUTPATIENT
Start: 2023-05-24 | End: 2023-05-28 | Stop reason: HOSPADM

## 2023-05-24 RX ORDER — CITRIC ACID/SODIUM CITRATE 334-500MG
30 SOLUTION, ORAL ORAL
Status: COMPLETED | OUTPATIENT
Start: 2023-05-24 | End: 2023-05-24

## 2023-05-24 RX ORDER — MISOPROSTOL 200 UG/1
400 TABLET ORAL
Status: DISCONTINUED | OUTPATIENT
Start: 2023-05-24 | End: 2023-05-24 | Stop reason: HOSPADM

## 2023-05-24 RX ORDER — OXYCODONE HYDROCHLORIDE 5 MG/1
5 TABLET ORAL EVERY 4 HOURS PRN
Status: DISCONTINUED | OUTPATIENT
Start: 2023-05-24 | End: 2023-05-28 | Stop reason: HOSPADM

## 2023-05-24 RX ORDER — OXYTOCIN/0.9 % SODIUM CHLORIDE 30/500 ML
340 PLASTIC BAG, INJECTION (ML) INTRAVENOUS CONTINUOUS PRN
Status: DISCONTINUED | OUTPATIENT
Start: 2023-05-24 | End: 2023-05-28 | Stop reason: HOSPADM

## 2023-05-24 RX ORDER — ONDANSETRON 2 MG/ML
4 INJECTION INTRAMUSCULAR; INTRAVENOUS EVERY 4 HOURS PRN
Status: DISCONTINUED | OUTPATIENT
Start: 2023-05-24 | End: 2023-05-28 | Stop reason: HOSPADM

## 2023-05-24 RX ORDER — MORPHINE SULFATE 1 MG/ML
INJECTION, SOLUTION EPIDURAL; INTRATHECAL; INTRAVENOUS PRN
Status: DISCONTINUED | OUTPATIENT
Start: 2023-05-24 | End: 2023-05-24

## 2023-05-24 RX ORDER — CEFAZOLIN SODIUM/WATER 3 G/30 ML
3 SYRINGE (ML) INTRAVENOUS
Status: COMPLETED | OUTPATIENT
Start: 2023-05-24 | End: 2023-05-24

## 2023-05-24 RX ORDER — TRANEXAMIC ACID 10 MG/ML
1 INJECTION, SOLUTION INTRAVENOUS EVERY 30 MIN PRN
Status: DISCONTINUED | OUTPATIENT
Start: 2023-05-24 | End: 2023-05-28 | Stop reason: HOSPADM

## 2023-05-24 RX ORDER — HYDROMORPHONE HCL IN WATER/PF 6 MG/30 ML
.3-.5 PATIENT CONTROLLED ANALGESIA SYRINGE INTRAVENOUS EVERY 30 MIN PRN
Status: DISCONTINUED | OUTPATIENT
Start: 2023-05-24 | End: 2023-05-28 | Stop reason: HOSPADM

## 2023-05-24 RX ADMIN — MORPHINE SULFATE 100 MCG: 1 INJECTION, SOLUTION EPIDURAL; INTRATHECAL; INTRAVENOUS at 14:26

## 2023-05-24 RX ADMIN — ONDANSETRON 4 MG: 2 INJECTION INTRAMUSCULAR; INTRAVENOUS at 15:09

## 2023-05-24 RX ADMIN — MAGNESIUM SULFATE IN WATER 2 G/HR: 40 INJECTION, SOLUTION INTRAVENOUS at 04:57

## 2023-05-24 RX ADMIN — MAGNESIUM SULFATE IN WATER 2 G/HR: 40 INJECTION, SOLUTION INTRAVENOUS at 19:04

## 2023-05-24 RX ADMIN — LABETALOL HYDROCHLORIDE 200 MG: 200 TABLET, FILM COATED ORAL at 05:58

## 2023-05-24 RX ADMIN — LABETALOL HYDROCHLORIDE 200 MG: 200 TABLET, FILM COATED ORAL at 17:18

## 2023-05-24 RX ADMIN — SODIUM CHLORIDE, POTASSIUM CHLORIDE, SODIUM LACTATE AND CALCIUM CHLORIDE: 600; 310; 30; 20 INJECTION, SOLUTION INTRAVENOUS at 00:01

## 2023-05-24 RX ADMIN — Medication 3 G: at 14:33

## 2023-05-24 RX ADMIN — Medication 340 ML/HR: at 14:56

## 2023-05-24 RX ADMIN — PHENYLEPHRINE HYDROCHLORIDE 0.5 MCG/KG/MIN: 10 INJECTION INTRAVENOUS at 14:39

## 2023-05-24 RX ADMIN — NIFEDIPINE 60 MG: 60 TABLET, FILM COATED, EXTENDED RELEASE ORAL at 10:46

## 2023-05-24 RX ADMIN — KETOROLAC TROMETHAMINE 30 MG: 30 INJECTION, SOLUTION INTRAMUSCULAR; INTRAVENOUS at 20:39

## 2023-05-24 RX ADMIN — SODIUM CITRATE AND CITRIC ACID MONOHYDRATE 30 ML: 500; 334 SOLUTION ORAL at 13:57

## 2023-05-24 RX ADMIN — MISOPROSTOL 200 MCG: 200 TABLET ORAL at 14:59

## 2023-05-24 RX ADMIN — MISOPROSTOL 600 MCG: 200 TABLET ORAL at 15:39

## 2023-05-24 RX ADMIN — OXYCODONE HYDROCHLORIDE 5 MG: 5 TABLET ORAL at 07:52

## 2023-05-24 RX ADMIN — NIFEDIPINE 60 MG: 60 TABLET, FILM COATED, EXTENDED RELEASE ORAL at 22:51

## 2023-05-24 RX ADMIN — SODIUM CHLORIDE, POTASSIUM CHLORIDE, SODIUM LACTATE AND CALCIUM CHLORIDE: 600; 310; 30; 20 INJECTION, SOLUTION INTRAVENOUS at 23:07

## 2023-05-24 RX ADMIN — FUROSEMIDE 40 MG: 40 TABLET ORAL at 20:13

## 2023-05-24 RX ADMIN — LIDOCAINE HYDROCHLORIDE 2 ML: 10 INJECTION, SOLUTION EPIDURAL; INFILTRATION; INTRACAUDAL; PERINEURAL at 10:17

## 2023-05-24 RX ADMIN — SENNOSIDES AND DOCUSATE SODIUM 1 TABLET: 50; 8.6 TABLET ORAL at 20:14

## 2023-05-24 RX ADMIN — SODIUM CHLORIDE, POTASSIUM CHLORIDE, SODIUM LACTATE AND CALCIUM CHLORIDE: 600; 310; 30; 20 INJECTION, SOLUTION INTRAVENOUS at 14:17

## 2023-05-24 RX ADMIN — FENTANYL CITRATE 15 MCG: 50 INJECTION, SOLUTION INTRAMUSCULAR; INTRAVENOUS at 14:26

## 2023-05-24 RX ADMIN — TRANEXAMIC ACID 1 G: 10 INJECTION, SOLUTION INTRAVENOUS at 14:00

## 2023-05-24 ASSESSMENT — ACTIVITIES OF DAILY LIVING (ADL)
ADLS_ACUITY_SCORE: 20

## 2023-05-24 NOTE — PLAN OF CARE
Goal Outcome Evaluation:    Pt continues on Magnesium Sulfate as ordered for Pre-e.  Pt tolerating well.  See flow sheet for hourly BP's.  No severe range pressures noted this shift. Vss, afebrile.  DTR's +3, no clonus.  Significant edema in legs, feet and ankles.  Pt has dull headache but is able to sleep.  Pt denies blurred vision or epigastric pain.  Lungs sounds clear and equal.  Second dose of Betamethasone given as ordered.Continuous monitoring, decels or ctx noted.  Dr Barros updated throughout the day on maternal/fetal status.  Plan for C/S tomorrow at 2pm, pt aware and in agreement with plan of care.  Pt states she is exhausted with minimal sleep last night, encouraged pt to rest.   present and supportive at bedside.  Report given to RN taking over.

## 2023-05-24 NOTE — ANESTHESIA CARE TRANSFER NOTE
Patient: Ifeoma Parisi    Procedure: Procedure(s):   section-primary       Diagnosis: Severe pre-eclampsia [O14.10]  Diagnosis Additional Information: No value filed.    Anesthesia Type:   Spinal     Note:    Oropharynx: oropharynx clear of all foreign objects and spontaneously breathing  Level of Consciousness: awake  Oxygen Supplementation: room air    Independent Airway: airway patency satisfactory and stable  Dentition: dentition unchanged  Vital Signs Stable: post-procedure vital signs reviewed and stable  Report to RN Given: handoff report given  Patient transferred to: Labor and Delivery  Comments: Transferred to OB PACU recovery, spontaneous respirations on room air with oxygen saturations maintained greater than 95%. SpO2, NiBP, and EKG monitors and alarms on and functioning, report on patient's clinical status given to OB recovery RN, RN questions answered, patient in hospital cart with siderails up Oxytocin 30 units in 500mL infusion connected to IV infusion pump in recovery bay and programmed to 100 mL/hr at handoff of care.    Handoff Report: Identifed the Patient, Identified the Reponsible Provider, Reviewed the pertinent medical history, Discussed the surgical course, Reviewed Intra-OP anesthesia mangement and issues during anesthesia, Set expectations for post-procedure period and Allowed opportunity for questions and acknowledgement of understanding      Vitals:  Vitals Value Taken Time   BP     Temp     Pulse     Resp     SpO2         Electronically Signed By: NATALI Mi CRNA  May 24, 2023  3:48 PM

## 2023-05-24 NOTE — PROGRESS NOTES
"ANTEPARTUM PROGRESS NOTE    Intrauterine Pregnancy at 30w2d weeks gestation, , with  Pre-eclampsia with severe features.    Subjective:   The patient feels well.  She denies new complaints. Headache had resolved but is back now. Able to sleep well last night.   She has normal fetal movement.  She denies contractions, cramping, pelvic pressure, backache. She denies vaginal bleeding.  extremities-Edema    Intake/Output Summary (Last 24 hours) at 2023 0735  Last data filed at 2023 0700  Gross per 24 hour   Intake 6969 ml   Output 1050 ml   Net 5919 ml     [unfilled]  Past Medical History:   Diagnosis Date     Depressive disorder     Anxiety     Past Surgical History:   Procedure Laterality Date     ENT SURGERY      [place] teeth       Objective:   /73   Temp 98.1  F (36.7  C)   Resp 16   Ht 1.626 m (5' 4\")   Wt 122.8 kg (270 lb 12 oz)   LMP 10/30/2022   SpO2 95%   BMI 46.47 kg/m      GENERAL APPEARANCE: in no distress, flushed  LUNGS:  CTA B/L, no wheezing or crackles.  ABDOMEN:  Abdomen soft, non-tender. BS normal. No masses, organomegaly,Fetal movement present.  EXTREMITIES: legs Normal and 3+ edema  NST:  reactive  TOCO: rare contractions  DTR 2/4 without clonus    Labs not drawn yet this am. LFTs rising, platelets stable at MN    Assessment:  Principal Problem:    Indication for care in labor and delivery, antepartum  Active Problems:    Preeclampsia, severe, third trimester    Obesity     23 year old 30w2d,  , with severe preE, currently on Mag, Nifedipine 60 mg every day and Labetalol 200 mg TID with stable BP but worsening LFTs  Plan:   Primary C/S 1400 today at 48 hours out from first betamethasone per MFM recommendation unless worsening status    "

## 2023-05-24 NOTE — ANESTHESIA POSTPROCEDURE EVALUATION
Patient: Ifeoma Parisi    Procedure: Procedure(s):   section-primary       Anesthesia Type:  Spinal    Note:  Disposition: Admission   Postop Pain Control: Uneventful            Sign Out: Well controlled pain   PONV: No   Neuro/Psych: Uneventful            Sign Out: Acceptable/Baseline neuro status   Airway/Respiratory: Uneventful            Sign Out: Acceptable/Baseline resp. status   CV/Hemodynamics: Uneventful            Sign Out: Acceptable CV status   Other NRE: NONE   DID A NON-ROUTINE EVENT OCCUR? No           Last vitals:  Vitals Value Taken Time   /80 23 1615   Temp     Pulse 89 23 1618   Resp 23 23 1618   SpO2 93 % 23 1618   Vitals shown include unvalidated device data.    Electronically Signed By: Cuong Murrell MD  May 24, 2023  4:19 PM

## 2023-05-24 NOTE — PROGRESS NOTES
"OB Service      Clinical situation reviewed in detail with patient and .  LFTs continue to rise, while platelets have not changed and remain stable, and BP remains stable on nifedipine + labetalol.        /68   Temp 98.6  F (37  C) (Oral)   Resp 16   Ht 1.626 m (5' 4\")   Wt 122.8 kg (270 lb 12 oz)   LMP 10/30/2022   SpO2 94%   BMI 46.47 kg/m      FHR tracing reassuring        ALT  351  (282)    (191)  Platelets  275 (270)  Creatinine 0.89      Plan:  Reviewed  in detail,  Including risks, consent signed.  PICC line now being placed.   at approx 1400 (at 48 hrs from first dose steroids).  NICU has met w/ patient.      Eliane Way MD  23  "

## 2023-05-24 NOTE — ANESTHESIA PREPROCEDURE EVALUATION
Anesthesia Pre-Procedure Evaluation    Patient: Ifeoma Parisi   MRN: [id number] : 2000        Procedure : Procedure(s):   section-primary          Past Medical History:   Diagnosis Date     Depressive disorder     Anxiety      Past Surgical History:   Procedure Laterality Date     ENT SURGERY      [place] teeth      Allergies   Allergen Reactions     Drysol [Aluminum Chloride]       Social History     Tobacco Use     Smoking status: Never     Smokeless tobacco: Never   Vaping Use     Vaping status: Not on file   Substance Use Topics     Alcohol use: Not Currently      Wt Readings from Last 1 Encounters:   23 122.8 kg (270 lb 12 oz)        Anesthesia Evaluation            ROS/MED HX  ENT/Pulmonary:       Neurologic:       Cardiovascular: Comment: Severe pre-eclampsia      METS/Exercise Tolerance:     Hematologic:       Musculoskeletal:       GI/Hepatic:       Renal/Genitourinary:       Endo: Comment: BMI 45    (+) Obesity,     Psychiatric/Substance Use:     (+) psychiatric history depression     Infectious Disease:       Malignancy:       Other:               OUTSIDE LABS:  CBC:   Lab Results   Component Value Date    WBC 18.9 (H) 2023    WBC 18.8 (H) 2023    HGB 11.2 (L) 2023    HGB 11.7 2023    HCT 32.1 (L) 2023    HCT 33.4 (L) 2023     2023     2023     BMP:   Lab Results   Component Value Date     (L) 2023    POTASSIUM 4.3 2023    CHLORIDE 106 2023    CO2 20 (L) 2023    BUN 9.9 2023    CR 0.89 2023    CR 0.82 2023     (H) 2023     COAGS: No results found for: PTT, INR, FIBR  POC: No results found for: BGM, HCG, HCGS  HEPATIC:   Lab Results   Component Value Date    ALBUMIN 2.9 (L) 2023    PROTTOTAL 5.8 (L) 2023     (H) 2023     (H) 2023    ALKPHOS 141 (H) 2023    BILITOTAL 0.2 2023     OTHER:   Lab Results   Component  Value Date    JAMEY 8.6 05/22/2023       Anesthesia Plan    ASA Status:  3      Anesthesia Type: Spinal.              Consents            Postoperative Care    Pain management: Multi-modal analgesia.   PONV prophylaxis: Ondansetron (or other 5HT-3), Background Propofol Infusion     Comments:                Austen Pham MD

## 2023-05-24 NOTE — PROVIDER NOTIFICATION
05/23/23 2109   Provider Notification   Provider Name/Title Dr Barros   Method of Notification Phone   Request Evaluate-Remote   Notification Reason Lab Results;Status Update     Notified Dr Barros of lab results per flow sheet (ALT, AST and, creatinine increased). Per Dr Barros order ALT, AST, creatinine, CBC with platelets for 0600. Pt NPO at midnight, can have sips of water and ice chips till 0600. TORB

## 2023-05-24 NOTE — PLAN OF CARE
Mildly elevated blood pressures through the night, no critical/treatable blood pressures. Afebrile. Declined headache, vision changes, epigastric pain, SOB. Lung sounds clear and equal. Latest reflex check +2, no clonus. Edema increasing. NPO since midnight in preparation for c/s. Pt reported having a good night sleep. Mag gtt infusing.

## 2023-05-24 NOTE — ANESTHESIA PROCEDURE NOTES
"Intrathecal Procedure Note    Pre-Procedure   Staff -        Anesthesiologist:  Austen Pham MD       Performed By: Anesthesiologist       Location: OR       Pre-Anesthestic Checklist: patient identified, IV checked, risks and benefits discussed, informed consent, monitors and equipment checked, pre-op evaluation and at physician/surgeon's request  Timeout:       Correct Patient: Yes        Correct Procedure: Yes        Correct Site: Yes        Correct Position: Yes   Procedure Documentation  Procedure: intrathecal       Patient Position: sitting       Patient Prep/Sterile Barriers: sterile gloves, mask, patient draped       Skin prep: Chloraprep       Insertion Site: L3-4. (midline approach).       Spinal Needle Type: Yusuf tip       Introducer used       Introducer: 20 G       # of attempts: 1 and  # of redirects:  0    Assessment/Narrative         Paresthesias: No.       CSF fluid: clear.     Comments:  Duramorph and fentanyl dosed along with bupivacaine.  T4 sensory level confirmed bilaterally prior to incision.   The patient was prepped and draped in a sterile fashion.  Topical anesthesia was injected subcutaneously using 1% lidocaine.  A 25G Pencan needle was advanced via a 20 G introducer at the the L3-4 level.  Clear CSF obtained, with birefringence on aspiration.  CSF freely aspirated after injection of 10.5 mg bupivacaine.  No paresthesias reported by patient, who tolerated the procedure well.      FOR [facility] ([facility]) ONLY:   Pain Team Contact information: please page the Pain Team Via [email or web address]. Search \"Pain\". During daytime hours, please page the attending first. At night please page the resident first.      "

## 2023-05-24 NOTE — PLAN OF CARE
Anesthesia consulted at 0815 regarding medication and IV access for pt's , which is scheduled at 1400 on 23. Per Dr. Benavidez, [facility], RN should hold 1400 dose of 200mg labetalol if BP is <140/80. Additionally, it is very difficult to collect labs and maintain IV access d/t patients severe edema. Dr. Benavidez recommended PICC line placement. Dr. Carroll in agreement with access placement. Orders placed. Will cont to monitor.

## 2023-05-24 NOTE — PROVIDER NOTIFICATION
05/24/23 0055   Provider Notification   Provider Name/Title Dr Barros   Method of Notification Phone   Request Evaluate - Remote   Notification Reason Status Update;Lab/Diagnostic Study   Notified Dr Barros results for labs drawn around midnight per flow sheet. Also, notified MD that pt is getting more edematous and has low urine output. No new orders at this time.

## 2023-05-24 NOTE — PROCEDURES
[facility]    Double Lumen PICC Placement    Date/Time: 5/24/2023 10:20 AM    Performed by: Neisha Estrada RN  Authorized by: Melisa Carroll MD  Indications: vascular access      UNIVERSAL PROTOCOL   Site Marked: Yes  Prior Images Obtained and Reviewed:  Yes  Required items: Required blood products, implants, devices and special equipment available    Patient identity confirmed:  Verbally with patient, arm band and hospital-assigned identification number  NA - No sedation, light sedation, or local anesthesia  Confirmation Checklist:  Patient's identity using two indicators, correct equipment/implants were available, relevant allergies and procedure was appropriate and matched the consent or emergent situation  Time out: Immediately prior to the procedure a time out was called    Universal Protocol: the Joint Commission Universal Protocol was followed    Preparation: Patient was prepped and draped in usual sterile fashion       ANESTHESIA    Local Anesthetic: Lidocaine 1% without epinephrine  Anesthetic Total (mL):  2      SEDATION    Patient Sedated: No        Skin prep agent: skin prep agent completely dried prior to procedure  Sterile barriers: maximum sterile barriers were used: cap, mask, sterile gown, sterile gloves, and large sterile sheet  Type of line used: PICC  Catheter type: double lumen  Lumen type: valved and power PICC  Lumen Identification: Red and Purple  Catheter size: 5 Fr  Brand: Bard  Lot number: [id number]  Placement method: venipuncture, MST and ultrasound  Number of attempts: 1  Difficulty threading catheter: no  Successful placement: yes  Orientation: right    Location: basilic vein  Tip Location: SVC/RA Junction  Arm circumference: adults 10 cm  Extremity circumference: 39  Visible catheter length: 6  Total catheter length: 47  Internal Lumen Volume: 41 mL    Dressing and securement: chlorhexidine patch applied, subcutaneous anchor securement system,  sterile dressing applied, site cleansed, securement device, transparent dressing and transparent securement dressing  Post procedure assessment: blood return through all ports and placement verified by 3CG technology  PROCEDURE   Patient Tolerance:  Patient tolerated the procedure well with no immediate complicationsDescribe Procedure: Nursing note  Pt a/o x 4,  at the bedside. The writer explained to the pt and her  the risks/benefits of the procedure and addressed all their questions. Both verbalized understanding and pt signed consent form. Successfully placed double lumen PICC on the right basilic vein on one attempt with good blood return noted.   Disposal: sharps and needle count correct at the end of procedure, needles and guidewire disposed in sharps container

## 2023-05-24 NOTE — PROGRESS NOTES
I was present and assisted throughout the surgery on the patient. I assisted with opening and closing of incisions, tissue retraction and delivery of the infant. My presence was important for the safety of mother and child

## 2023-05-25 PROBLEM — O14.23 HELLP SYNDROME (HELLP), THIRD TRIMESTER: Status: ACTIVE | Noted: 2023-05-25

## 2023-05-25 LAB
ALT SERPL W P-5'-P-CCNC: 431 U/L (ref 10–35)
AST SERPL W P-5'-P-CCNC: 262 U/L (ref 10–35)
CREAT SERPL-MCNC: 0.85 MG/DL (ref 0.51–0.95)
GFR SERPL CREATININE-BSD FRML MDRD: >90 ML/MIN/1.73M2
HGB BLD-MCNC: 10.2 G/DL (ref 11.7–15.7)
MAGNESIUM SERPL-MCNC: 7.7 MG/DL (ref 1.7–2.3)
PLATELET # BLD AUTO: 244 10E3/UL (ref 150–450)

## 2023-05-25 PROCEDURE — 250N000013 HC RX MED GY IP 250 OP 250 PS 637: Performed by: SPECIALIST

## 2023-05-25 PROCEDURE — 250N000013 HC RX MED GY IP 250 OP 250 PS 637: Performed by: OBSTETRICS & GYNECOLOGY

## 2023-05-25 PROCEDURE — 120N000012 HC R&B POSTPARTUM

## 2023-05-25 PROCEDURE — 83735 ASSAY OF MAGNESIUM: CPT | Performed by: SPECIALIST

## 2023-05-25 PROCEDURE — 250N000011 HC RX IP 250 OP 636: Performed by: OBSTETRICS & GYNECOLOGY

## 2023-05-25 PROCEDURE — 85018 HEMOGLOBIN: CPT | Performed by: OBSTETRICS & GYNECOLOGY

## 2023-05-25 PROCEDURE — 85049 AUTOMATED PLATELET COUNT: CPT | Performed by: OBSTETRICS & GYNECOLOGY

## 2023-05-25 PROCEDURE — 250N000011 HC RX IP 250 OP 636: Performed by: SPECIALIST

## 2023-05-25 PROCEDURE — 82565 ASSAY OF CREATININE: CPT | Performed by: OBSTETRICS & GYNECOLOGY

## 2023-05-25 PROCEDURE — 84460 ALANINE AMINO (ALT) (SGPT): CPT | Performed by: OBSTETRICS & GYNECOLOGY

## 2023-05-25 PROCEDURE — 84450 TRANSFERASE (AST) (SGOT): CPT | Performed by: OBSTETRICS & GYNECOLOGY

## 2023-05-25 RX ADMIN — HYDROXYZINE HYDROCHLORIDE 25 MG: 25 TABLET, FILM COATED ORAL at 23:47

## 2023-05-25 RX ADMIN — FUROSEMIDE 20 MG: 20 TABLET ORAL at 08:10

## 2023-05-25 RX ADMIN — OXYCODONE HYDROCHLORIDE 5 MG: 5 TABLET ORAL at 23:47

## 2023-05-25 RX ADMIN — HEPARIN SODIUM 5000 UNITS: 5000 INJECTION, SOLUTION INTRAVENOUS; SUBCUTANEOUS at 08:52

## 2023-05-25 RX ADMIN — SENNOSIDES AND DOCUSATE SODIUM 1 TABLET: 50; 8.6 TABLET ORAL at 08:10

## 2023-05-25 RX ADMIN — KETOROLAC TROMETHAMINE 30 MG: 30 INJECTION, SOLUTION INTRAMUSCULAR; INTRAVENOUS at 09:07

## 2023-05-25 RX ADMIN — HEPARIN SODIUM 5000 UNITS: 5000 INJECTION, SOLUTION INTRAVENOUS; SUBCUTANEOUS at 21:24

## 2023-05-25 RX ADMIN — MAGNESIUM SULFATE IN WATER 2 G/HR: 40 INJECTION, SOLUTION INTRAVENOUS at 04:40

## 2023-05-25 RX ADMIN — FUROSEMIDE 20 MG: 20 TABLET ORAL at 16:13

## 2023-05-25 RX ADMIN — KETOROLAC TROMETHAMINE 30 MG: 30 INJECTION, SOLUTION INTRAMUSCULAR; INTRAVENOUS at 15:19

## 2023-05-25 RX ADMIN — IBUPROFEN 800 MG: 400 TABLET ORAL at 23:05

## 2023-05-25 RX ADMIN — SENNOSIDES AND DOCUSATE SODIUM 1 TABLET: 50; 8.6 TABLET ORAL at 21:24

## 2023-05-25 RX ADMIN — LABETALOL HYDROCHLORIDE 200 MG: 200 TABLET, FILM COATED ORAL at 16:13

## 2023-05-25 RX ADMIN — LABETALOL HYDROCHLORIDE 200 MG: 200 TABLET, FILM COATED ORAL at 01:21

## 2023-05-25 RX ADMIN — KETOROLAC TROMETHAMINE 30 MG: 30 INJECTION, SOLUTION INTRAMUSCULAR; INTRAVENOUS at 02:37

## 2023-05-25 ASSESSMENT — ACTIVITIES OF DAILY LIVING (ADL)
ADLS_ACUITY_SCORE: 30
ADLS_ACUITY_SCORE: 20
ADLS_ACUITY_SCORE: 20
ADLS_ACUITY_SCORE: 30

## 2023-05-25 NOTE — PROVIDER NOTIFICATION
05/25/23 0600   Provider Notification   Provider Name/Title Dr. Way   Method of Notification Phone   Request Evaluate-Remote   Notification Reason Status Update;Vital Signs Change;Lab Results     Pt on magnesium 2 gm/hr with LR infusing at 75 ml/hr. At 0600, two RNs attempted to stand pt at bedside and obtain weight. Pt lethargic, c/o visual disturbances, and pale. /57. Mag stopped at 0610. AM labs and STAT mag level collected from PICC line. Dr. Way notified of status change and vitals. Verbal order to hold scheduled Nifedipine and MD acknowledged IV magnesium discontinuation. Nursing will handoff and continue to monitor

## 2023-05-25 NOTE — PROGRESS NOTES
"[facility]   Obstetrics Post-Op / Progress Note         Assessment and Plan:    Assessment:   Post-operative day #1  Low transverse primary  section  L&D complications: Intrauterine pregnancy at 30+3 weeks gestation  Severe pre-eclampsia      Doing well.  Clean wound without signs of infection.  No immediate surgical complications identified.  No excessive bleeding  Pain well-controlled.  LFTs still rising, will repeat tomorrow        Plan:   Ambulation encouraged  Pain control measures as needed  Reportable signs and symptoms dicussed with the patient  Discontinue Mag- patient was symptomatic and her level was high. Good diuresis  Hold anti-hypertensives for BPs <120/70            Interval History:   Doing well. Had some hypotension during the night and magnesium was turned off and meds held. BP still low.   Pain is controlled.  No fevers.  No history of wound drainage, warmth or significant erythema.  Good appetite.  Denies chest pain, shortness of breath, nausea or vomiting.  Baby doing well in NICU, mom is pumping          Significant Problems:      Patient Active Problem List   Diagnosis     Preeclampsia, severe, third trimester     Obesity     Indication for care in labor and delivery, antepartum     HELLP syndrome (HELLP), third trimester      delivery delivered             Review of Systems:    The patient denies any chest pain, shortness of breath, excessive pain, fever, chills, purulent drainage from the wound, nausea or vomiting.          Medications:   All medications related to the patient's surgery have been reviewed          Physical Exam:   All vitals stable  Blood pressure 103/58, pulse 76, temperature 97.6  F (36.4  C), temperature source Oral, resp. rate 16, height 1.626 m (5' 4\"), weight 124.3 kg (274 lb), last menstrual period 10/30/2022, SpO2 99 %.  Wound clean and dry with minimal or no drainage.  Surrounding skin with minimal erythema.          Data:   All " laboratory data related to this surgery reviewed  Lab Results   Component Value Date    HGB 10.2 (L) 05/25/2023       TONJA PHELPS MD

## 2023-05-25 NOTE — PLAN OF CARE
Goal Outcome Evaluation:      Vss, b/p slowly increasing, most recently 129/74, Labetalol given, order to resume procardia if pt b/p >or equal to 130/80. Pt with generalized edema, +3 to bilateral lower extreme ties. Pt with occasional complaint of headache on and off through the day otherwise denies visual disturbance or epigastric pain. Pt with normal reflexes and no clonus. Pt taking scheduled lasix and having good output. Pt tolerating regular diet and good po intake. Pt with PICC in place, purple line occluded, able to give medications and flush red line. Pain managed with Toradol. Pumping every 3 hours. Spouse at bedside and supportive.  Plan to continue to monitor b/p and will have AST and ALT 5/26. Encouraged to call with questions/needs.

## 2023-05-25 NOTE — PLAN OF CARE
Mag 2 gm/hr stopped at 0610 due to change in LOC and visual disturbances. Pt reports feeling better overall. VSS. C/o headache. Reflexes 2+, no clonus. Postpartum assessment WDL. 3+ pitting edema. Incision w/ Aquacel dressing. CDI. Zamora catheter intact, good urine output. Pain controlled with IV Toradol- dilaudid available.Advanced to regular diet. Ax2. Patient reports passing gas. Pumping q3h. Plan to visit baby in NICU today. Will continue with current plan of care.

## 2023-05-25 NOTE — CARE PLAN
[address] [place] [address] EMERGENCY DEPT 
[address] [id number] 
[phone] Work/School Note Date: 1/4/2021 To Whom It May concern: Lorie Glez was evaulated by the following provider(s): 
Nurse Practitioner: Kylah Muse NP.   COVID19 virus is suspected. Per the CDC guidelines we recommend home isolation until the following conditions are all met: 1. At least 10 days have passed since symptoms first appeared and 2. At least 24 hours have passed since last fever without the use of fever-reducing medications and 
3. Symptoms (e.g., cough, shortness of breath) have improved Sincerely, Tiara Paz NP Transferred to [facility] room 401 via cart. Baby in NICU. Accompanied by Registered Nurse. Personal belongings moved with patient to [facility]. Oriented patient to room, surroundings and call light - that is within reach of patient. Report given to ESDRAS [place]. Fundus and lochia checked with receiving RN. Patient tolerated transfer and is stable. ID bands double-checked with receiving RN

## 2023-05-25 NOTE — PROVIDER NOTIFICATION
Dr. Carroll paged.  Updated on pt b/p slowly increasing.  Order to give 200 mg labetalol now and then to give scheduled procardia if pt > or equal to 130/80.

## 2023-05-25 NOTE — PLAN OF CARE
RN continuously at bedside trying to find FHT from [id number] and [id number]. FHT heard intermittently and verified per Dr. Way.

## 2023-05-25 NOTE — LACTATION NOTE
"Initial Lactation visit with Ifeoma, significant other Marcelo. Baby boy is in the NICU at 30 weeks. Ifeoma started pumping every 3 hours and is planning to work on pumping every 3 hours around the clock moving forward. Discussed hands on pumping. Reviewed Medela symphony settings with Ifeoma and encouraged use of initiate mode. Encouraged Ifeoma to get a hands free bra for use with pumping. Explained benefits of holding baby skin on skin to help promote better breastfeeding outcomes when baby is safe to do so. Ifeoma does NOT have a breast pump for home use. Discussed considering hospital grade pump rental for short term use at home while infant in NICU. \"Milk Making Reminders\" and \"Pump volume log\" given and reviewed. Encouraged watching \"Dirk Maximizing Milk\" video online. Will revisit as needed.    Sabrina Odom, RN-C, IBCLC, MNN, PHN, BSN    "

## 2023-05-26 LAB
ALT SERPL W P-5'-P-CCNC: 328 U/L (ref 10–35)
AST SERPL W P-5'-P-CCNC: 129 U/L (ref 10–35)

## 2023-05-26 PROCEDURE — 250N000013 HC RX MED GY IP 250 OP 250 PS 637: Performed by: SPECIALIST

## 2023-05-26 PROCEDURE — 120N000012 HC R&B POSTPARTUM

## 2023-05-26 PROCEDURE — 999N000040 HC STATISTIC CONSULT NO CHARGE VASC ACCESS

## 2023-05-26 PROCEDURE — 250N000013 HC RX MED GY IP 250 OP 250 PS 637: Performed by: OBSTETRICS & GYNECOLOGY

## 2023-05-26 PROCEDURE — 250N000011 HC RX IP 250 OP 636: Performed by: OBSTETRICS & GYNECOLOGY

## 2023-05-26 PROCEDURE — 84450 TRANSFERASE (AST) (SGOT): CPT | Performed by: SPECIALIST

## 2023-05-26 PROCEDURE — 999N000190 HC STATISTIC VAT ROUNDS

## 2023-05-26 PROCEDURE — 84460 ALANINE AMINO (ALT) (SGPT): CPT | Performed by: SPECIALIST

## 2023-05-26 RX ORDER — LABETALOL 100 MG/1
100 TABLET, FILM COATED ORAL ONCE
Status: COMPLETED | OUTPATIENT
Start: 2023-05-26 | End: 2023-05-26

## 2023-05-26 RX ADMIN — HEPARIN SODIUM 5000 UNITS: 5000 INJECTION, SOLUTION INTRAVENOUS; SUBCUTANEOUS at 21:09

## 2023-05-26 RX ADMIN — IBUPROFEN 800 MG: 400 TABLET ORAL at 21:08

## 2023-05-26 RX ADMIN — LABETALOL HYDROCHLORIDE 100 MG: 100 TABLET, FILM COATED ORAL at 10:45

## 2023-05-26 RX ADMIN — FUROSEMIDE 20 MG: 20 TABLET ORAL at 16:03

## 2023-05-26 RX ADMIN — SENNOSIDES AND DOCUSATE SODIUM 1 TABLET: 50; 8.6 TABLET ORAL at 08:00

## 2023-05-26 RX ADMIN — IBUPROFEN 800 MG: 400 TABLET ORAL at 06:03

## 2023-05-26 RX ADMIN — LABETALOL HYDROCHLORIDE 200 MG: 200 TABLET, FILM COATED ORAL at 00:57

## 2023-05-26 RX ADMIN — ALTEPLASE 2 MG: 2.2 INJECTION, POWDER, LYOPHILIZED, FOR SOLUTION INTRAVENOUS at 21:42

## 2023-05-26 RX ADMIN — HEPARIN SODIUM 5000 UNITS: 5000 INJECTION, SOLUTION INTRAVENOUS; SUBCUTANEOUS at 09:51

## 2023-05-26 RX ADMIN — NIFEDIPINE 60 MG: 60 TABLET, FILM COATED, EXTENDED RELEASE ORAL at 20:48

## 2023-05-26 RX ADMIN — NIFEDIPINE 60 MG: 60 TABLET, FILM COATED, EXTENDED RELEASE ORAL at 07:59

## 2023-05-26 RX ADMIN — LABETALOL HYDROCHLORIDE 200 MG: 200 TABLET, FILM COATED ORAL at 09:03

## 2023-05-26 RX ADMIN — FUROSEMIDE 20 MG: 20 TABLET ORAL at 07:59

## 2023-05-26 RX ADMIN — SENNOSIDES AND DOCUSATE SODIUM 1 TABLET: 50; 8.6 TABLET ORAL at 20:49

## 2023-05-26 RX ADMIN — LABETALOL HYDROCHLORIDE 300 MG: 200 TABLET, FILM COATED ORAL at 16:03

## 2023-05-26 RX ADMIN — IBUPROFEN 800 MG: 400 TABLET ORAL at 12:47

## 2023-05-26 ASSESSMENT — ACTIVITIES OF DAILY LIVING (ADL)
ADLS_ACUITY_SCORE: 21
ADLS_ACUITY_SCORE: 21
ADLS_ACUITY_SCORE: 20
ADLS_ACUITY_SCORE: 21
ADLS_ACUITY_SCORE: 20
ADLS_ACUITY_SCORE: 21
ADLS_ACUITY_SCORE: 20
ADLS_ACUITY_SCORE: 20
ADLS_ACUITY_SCORE: 21
ADLS_ACUITY_SCORE: 21

## 2023-05-26 NOTE — PLAN OF CARE
Goal Outcome Evaluation:  B/P 150's/80's this am/early afternoon. MD increased dose of labetalol to 300mg tid, also taking nifedipine and lasix. Last B/P 130's/70's, see flowsheet.  Postpartum assessment, ex 3+edema. Labs improving, will be repeated in am. Incision covered with aquacell dressing. Pain controlled with ibuprofen and oxycodone. Patient ambulating idependently. Patient reports passing gas. Pumping for baby in NICU. Will continue with current plan of care.        Plan of Care Reviewed With: patient, spouse    Overall Patient Progress: improvingOverall Patient Progress: improving

## 2023-05-26 NOTE — PROGRESS NOTES
[facility]  Obstetrics Post-Op / Progress Note         Assessment and Plan:    Assessment:   Post-operative day #2  Low transverse primary  section  L&D complications: Severe preeclampsia      Doing well.  Normal healing wound.  No excessive bleeding  Pain well-controlled.  Labs trending appropriately  Significant edema still present      Plan:   Continue BID lasix today  On 60mg nifedipine bid (was held on eves), will incr TID labetalol to 300  Routine advances  Encouraged rest            Interval History:   Doing well.  Pain is well-controlled.  No fevers.  No history of wound drainage, warmth or significant erythema.  Good appetite.  Denies chest pain, shortness of breath, nausea or vomiting.  Ambulatory.  Pumping          Significant Problems:      Past Medical History:   Diagnosis Date     Depressive disorder     Anxiety             Review of Systems:    The patient denies any chest pain, shortness of breath, excessive pain, fever, chills, purulent drainage from the wound, nausea or vomiting.          Medications:   All medications related to the patient's surgery have been reviewed          Physical Exam:     Vitals were reviewed  Patient Vitals for the past 24 hrs:   BP Temp Temp src Pulse Resp SpO2 Weight   23 0903 (!) 156 -- -- 82 -- -- --   23 0752 (!) 156/ 98.1  F (36.7  C) Oral -- 18 -- --   23 0641 -- -- -- -- -- -- 120 kg (264 lb 9.6 oz)   23 0601 138/79 -- -- 85 -- -- --   23 0330 136/76 98.3  F (36.8  C) Oral 81 24 -- --   23 0210 (!) 144/76 -- -- -- -- -- --   23 0155 (!) 150/80 -- -- -- -- -- --   23 0140 (!) 156/88 -- -- -- -- -- --   23 0125 (!) 148/80 -- -- -- -- -- --   23 0110 (!) 149/79 -- -- -- -- -- --   23 0056 (!) 149/81 -- -- 76 -- -- --   23 2300 (!) 140/80 98.4  F (36.9  C) Oral 86 22 -- --   23 195 125/68 98.2  F (36.8  C) Oral 87 20 97 % --   23 1515 129/74 98   F (36.7  C) Oral -- 16 99 % --   05/25/23 1151 125/63 97.7  F (36.5  C) Oral -- 16 99 % --   05/25/23 1035 103/58 -- -- -- 16 99 % --     I/O last 3 completed shifts:  In: 2950 [P.O.:2950]  Out: 7000 [Urine:7000]  AO, NAD  Abd: soft, NT  Inc: covered, min old drainage midline  Ext: 3+ BLE edema          Data:      (262, 210, 191)   (431, 351, 282)  plts have been stable last 5/25 244    All laboratory data related to this surgery reviewed  Hemoglobin   Date Value Ref Range Status   05/25/2023 10.2 (L) 11.7 - 15.7 g/dL Final   05/24/2023 11.2 (L) 11.7 - 15.7 g/dL Final   05/24/2023 11.7 11.7 - 15.7 g/dL Final   05/23/2023 11.5 (L) 11.7 - 15.7 g/dL Final   05/23/2023 11.3 (L) 11.7 - 15.7 g/dL Final     No imaging studies have been ordered    Pamela Null MD

## 2023-05-26 NOTE — PLAN OF CARE
BP readings trending upward throughout the night. VSS now. Scheduled Procardia held in evening for BP reading 125/68. Pt denies S/S of Pre-E. A&Ox4. Postpartum assessment WDL. Ambulating independently. Aqaucel dressing CDI. Pain controlled with ibuprofen, oxycodone, and hydroxyzine po. 3+ edema. Good UO. On heparin and lasix. PICC line-AM AST and ALT labs. Pumping q3h. Nursing will continue to monitor and assess.

## 2023-05-27 LAB
ALT SERPL W P-5'-P-CCNC: 262 U/L (ref 10–35)
AST SERPL W P-5'-P-CCNC: 81 U/L (ref 10–35)
BASOPHILS # BLD AUTO: 0 10E3/UL (ref 0–0.2)
BASOPHILS NFR BLD AUTO: 0 %
EOSINOPHIL # BLD AUTO: 0.2 10E3/UL (ref 0–0.7)
EOSINOPHIL NFR BLD AUTO: 2 %
ERYTHROCYTE [DISTWIDTH] IN BLOOD BY AUTOMATED COUNT: 15.2 % (ref 10–15)
HCT VFR BLD AUTO: 29.7 % (ref 35–47)
HGB BLD-MCNC: 9.7 G/DL (ref 11.7–15.7)
IMM GRANULOCYTES # BLD: 0.2 10E3/UL
IMM GRANULOCYTES NFR BLD: 2 %
LYMPHOCYTES # BLD AUTO: 3.1 10E3/UL (ref 0.8–5.3)
LYMPHOCYTES NFR BLD AUTO: 25 %
MCH RBC QN AUTO: 31.7 PG (ref 26.5–33)
MCHC RBC AUTO-ENTMCNC: 32.7 G/DL (ref 31.5–36.5)
MCV RBC AUTO: 97 FL (ref 78–100)
MONOCYTES # BLD AUTO: 0.8 10E3/UL (ref 0–1.3)
MONOCYTES NFR BLD AUTO: 7 %
NEUTROPHILS # BLD AUTO: 7.8 10E3/UL (ref 1.6–8.3)
NEUTROPHILS NFR BLD AUTO: 64 %
NRBC # BLD AUTO: 0.1 10E3/UL
NRBC BLD AUTO-RTO: 0 /100
PLATELET # BLD AUTO: 255 10E3/UL (ref 150–450)
POTASSIUM SERPL-SCNC: 4.3 MMOL/L (ref 3.4–5.3)
RBC # BLD AUTO: 3.06 10E6/UL (ref 3.8–5.2)
WBC # BLD AUTO: 12.1 10E3/UL (ref 4–11)

## 2023-05-27 PROCEDURE — 250N000013 HC RX MED GY IP 250 OP 250 PS 637: Performed by: SPECIALIST

## 2023-05-27 PROCEDURE — 85025 COMPLETE CBC W/AUTO DIFF WBC: CPT | Performed by: OBSTETRICS & GYNECOLOGY

## 2023-05-27 PROCEDURE — 84450 TRANSFERASE (AST) (SGOT): CPT | Performed by: OBSTETRICS & GYNECOLOGY

## 2023-05-27 PROCEDURE — 250N000013 HC RX MED GY IP 250 OP 250 PS 637: Performed by: OBSTETRICS & GYNECOLOGY

## 2023-05-27 PROCEDURE — 84132 ASSAY OF SERUM POTASSIUM: CPT | Performed by: SPECIALIST

## 2023-05-27 PROCEDURE — 84460 ALANINE AMINO (ALT) (SGPT): CPT | Performed by: OBSTETRICS & GYNECOLOGY

## 2023-05-27 PROCEDURE — 250N000011 HC RX IP 250 OP 636: Performed by: OBSTETRICS & GYNECOLOGY

## 2023-05-27 PROCEDURE — 120N000012 HC R&B POSTPARTUM

## 2023-05-27 RX ORDER — LABETALOL 200 MG/1
400 TABLET, FILM COATED ORAL EVERY 8 HOURS SCHEDULED
Status: DISCONTINUED | OUTPATIENT
Start: 2023-05-27 | End: 2023-05-28 | Stop reason: HOSPADM

## 2023-05-27 RX ADMIN — LABETALOL HYDROCHLORIDE 400 MG: 200 TABLET, FILM COATED ORAL at 08:59

## 2023-05-27 RX ADMIN — NIFEDIPINE 60 MG: 60 TABLET, FILM COATED, EXTENDED RELEASE ORAL at 20:03

## 2023-05-27 RX ADMIN — IBUPROFEN 800 MG: 400 TABLET ORAL at 16:37

## 2023-05-27 RX ADMIN — IBUPROFEN 800 MG: 400 TABLET ORAL at 03:01

## 2023-05-27 RX ADMIN — FUROSEMIDE 20 MG: 20 TABLET ORAL at 16:37

## 2023-05-27 RX ADMIN — HEPARIN SODIUM 5000 UNITS: 5000 INJECTION, SOLUTION INTRAVENOUS; SUBCUTANEOUS at 09:07

## 2023-05-27 RX ADMIN — SENNOSIDES AND DOCUSATE SODIUM 1 TABLET: 50; 8.6 TABLET ORAL at 08:59

## 2023-05-27 RX ADMIN — HEPARIN SODIUM 5000 UNITS: 5000 INJECTION, SOLUTION INTRAVENOUS; SUBCUTANEOUS at 21:20

## 2023-05-27 RX ADMIN — SENNOSIDES AND DOCUSATE SODIUM 1 TABLET: 50; 8.6 TABLET ORAL at 20:03

## 2023-05-27 RX ADMIN — NIFEDIPINE 60 MG: 60 TABLET, FILM COATED, EXTENDED RELEASE ORAL at 08:59

## 2023-05-27 RX ADMIN — LABETALOL HYDROCHLORIDE 400 MG: 200 TABLET, FILM COATED ORAL at 16:37

## 2023-05-27 RX ADMIN — LABETALOL HYDROCHLORIDE 300 MG: 200 TABLET, FILM COATED ORAL at 00:17

## 2023-05-27 RX ADMIN — SIMETHICONE 80 MG: 80 TABLET, CHEWABLE ORAL at 18:26

## 2023-05-27 RX ADMIN — FUROSEMIDE 20 MG: 20 TABLET ORAL at 08:59

## 2023-05-27 RX ADMIN — IBUPROFEN 800 MG: 400 TABLET ORAL at 09:07

## 2023-05-27 ASSESSMENT — ACTIVITIES OF DAILY LIVING (ADL)
ADLS_ACUITY_SCORE: 20

## 2023-05-27 NOTE — PROGRESS NOTES
"[facility]   Obstetrics Post-Op / Progress Note         Assessment and Plan:    Assessment:   Post-operative day #3  Low transverse primary  section  L&D complications: Intrauterine pregnancy at 30+2 weeks gestation  Primary  section  Severe pre-eclampsia      Doing well.  No excessive bleeding  Pain well-controlled.  Liver enzymes declining; edema improving slowly; BPs still slightly elevated      Plan:   Ambulation encouraged  Reportable signs and symptoms dicussed with the patient  Labetalol increased to 400 mg TID along with Nifedipine 60 mg BID            Interval History:   Doing well.  Pain is controlled.  No fevers.  No history of wound drainage, warmth or significant erythema.  Good appetite.  Denies chest pain, shortness of breath, nausea or vomiting.  Baby in NICU on CPAP, just starting minor enteral feeds, milk coming in          Significant Problems:      Patient Active Problem List   Diagnosis     Preeclampsia, severe, third trimester     Obesity     Indication for care in labor and delivery, antepartum     HELLP syndrome (HELLP), third trimester      delivery delivered             Review of Systems:    The patient denies any chest pain, shortness of breath, excessive pain, fever, chills, purulent drainage from the wound, nausea or vomiting.          Medications:   All medications related to the patient's surgery have been reviewed          Physical Exam:   All vitals stable  Blood pressure (!) 143/93, pulse 95, temperature 98.1  F (36.7  C), resp. rate 16, height 1.626 m (5' 4\"), weight 115.4 kg (254 lb 6.4 oz), last menstrual period 10/30/2022, SpO2 97 %, unknown if currently breastfeeding.  Wound clean and dry with minimal or no drainage.  Surrounding skin with minimal erythema.          Data:   All laboratory data related to this surgery reviewed  Lab Results   Component Value Date    HGB 9.7 (L) 2023       TONJA PHELPS MD  "

## 2023-05-27 NOTE — PLAN OF CARE
VSS, ex Bp's range 140's/90's-120's/70's. Reflexes and clonus WNL. Incision C/D/I. Up to bathroom independently, voiding regularly. Strict I&O's. PICC line patent in both lumens. Pain controlled with  ibuprofen. Abdominal binder in use. Pumping every 3 hours for  in NICU. Continue with plan of care and notify provider as needed.

## 2023-05-27 NOTE — PLAN OF CARE
Goal Outcome Evaluation:  B/P 140's/90's this am. MD increased labetalol to 400 tid. Normotensive at 1230 check. Adequate intake and output. Pumping for baby in NICU, milk is in. Will con't to monitor.       Plan of Care Reviewed With: patient, spouse    Overall Patient Progress: improvingOverall Patient Progress: improving

## 2023-05-27 NOTE — PROGRESS NOTES
Purple lumen of PICC appears to have blood dwelling in lumen, does not flush or draw.  Assist primary nursing staff with Cath-rita administration via 3-way stopcock.  Primary nursing to follow further, will page VAT or flying squad with additional questions.

## 2023-05-27 NOTE — PLAN OF CARE
Goal Outcome Evaluation:    Vital signs stable.  Pt denies any headache, epigastric pain or visual disturbances at this time. Postpartum assessment WDL. Incision c/d/I with aquacell dressing. Pain controlled with ibuprofen, also given gas x for some distention. Pt does have +3 bilat LE edema.  PCD's on while shes in bed.  Patient ambulating with no assist but visiting baby in NICU via wheelchair. Patient reports passing gas.  Pt is pumping and her milk is coming in. Pt was given shells for sore nipples.  Spouse is very helpful and at bedside.  Patient and infant bonding well. Will continue with current plan of care.       Received refill request as [id number]. Patient chart reviewed. Refill accepted.    Eri King MD  Dermatology Resident  [facility]

## 2023-05-28 VITALS
HEART RATE: 85 BPM | OXYGEN SATURATION: 97 % | DIASTOLIC BLOOD PRESSURE: 86 MMHG | SYSTOLIC BLOOD PRESSURE: 147 MMHG | HEIGHT: 64 IN | BODY MASS INDEX: 42.59 KG/M2 | WEIGHT: 249.5 LBS | RESPIRATION RATE: 16 BRPM | TEMPERATURE: 98 F

## 2023-05-28 PROBLEM — O14.20 HELLP SYNDROME: Status: ACTIVE | Noted: 2023-05-28

## 2023-05-28 PROCEDURE — 250N000013 HC RX MED GY IP 250 OP 250 PS 637: Performed by: OBSTETRICS & GYNECOLOGY

## 2023-05-28 PROCEDURE — 250N000011 HC RX IP 250 OP 636: Performed by: OBSTETRICS & GYNECOLOGY

## 2023-05-28 PROCEDURE — 250N000013 HC RX MED GY IP 250 OP 250 PS 637: Performed by: SPECIALIST

## 2023-05-28 PROCEDURE — G0463 HOSPITAL OUTPT CLINIC VISIT: HCPCS

## 2023-05-28 RX ORDER — IBUPROFEN 600 MG/1
600 TABLET, FILM COATED ORAL EVERY 6 HOURS PRN
COMMUNITY
Start: 2023-05-28 | End: 2024-06-03

## 2023-05-28 RX ORDER — ACETAMINOPHEN 500 MG
500-1000 TABLET ORAL EVERY 6 HOURS PRN
COMMUNITY
Start: 2023-05-28 | End: 2024-06-03

## 2023-05-28 RX ORDER — LABETALOL 200 MG/1
400 TABLET, FILM COATED ORAL EVERY 8 HOURS
Qty: 90 TABLET | Refills: 0 | Status: SHIPPED | OUTPATIENT
Start: 2023-05-28 | End: 2024-06-03

## 2023-05-28 RX ORDER — DOCUSATE SODIUM 100 MG/1
100 CAPSULE, LIQUID FILLED ORAL 2 TIMES DAILY
COMMUNITY
Start: 2023-05-28 | End: 2024-06-03

## 2023-05-28 RX ORDER — FERROUS SULFATE 325(65) MG
325 TABLET ORAL
COMMUNITY
Start: 2023-05-28 | End: 2024-06-03

## 2023-05-28 RX ORDER — BREAST PUMP
1 EACH MISCELLANEOUS CONTINUOUS PRN
Qty: 1 EACH | Refills: 0 | Status: SHIPPED | OUTPATIENT
Start: 2023-05-28 | End: 2024-06-03

## 2023-05-28 RX ORDER — FUROSEMIDE 20 MG
20 TABLET ORAL DAILY
Qty: 2 TABLET | Refills: 0 | Status: SHIPPED | OUTPATIENT
Start: 2023-05-28 | End: 2024-06-03

## 2023-05-28 RX ADMIN — HEPARIN SODIUM 5000 UNITS: 5000 INJECTION, SOLUTION INTRAVENOUS; SUBCUTANEOUS at 08:44

## 2023-05-28 RX ADMIN — SENNOSIDES AND DOCUSATE SODIUM 1 TABLET: 50; 8.6 TABLET ORAL at 08:45

## 2023-05-28 RX ADMIN — IBUPROFEN 800 MG: 400 TABLET ORAL at 06:20

## 2023-05-28 RX ADMIN — IBUPROFEN 800 MG: 400 TABLET ORAL at 12:14

## 2023-05-28 RX ADMIN — LABETALOL HYDROCHLORIDE 400 MG: 200 TABLET, FILM COATED ORAL at 00:30

## 2023-05-28 RX ADMIN — LABETALOL HYDROCHLORIDE 400 MG: 200 TABLET, FILM COATED ORAL at 08:45

## 2023-05-28 RX ADMIN — FUROSEMIDE 20 MG: 20 TABLET ORAL at 08:45

## 2023-05-28 RX ADMIN — IBUPROFEN 800 MG: 400 TABLET ORAL at 00:26

## 2023-05-28 RX ADMIN — NIFEDIPINE 60 MG: 60 TABLET, FILM COATED, EXTENDED RELEASE ORAL at 08:45

## 2023-05-28 ASSESSMENT — ACTIVITIES OF DAILY LIVING (ADL)
ADLS_ACUITY_SCORE: 20

## 2023-05-28 NOTE — DISCHARGE INSTRUCTIONS
Preeclampsia   Call your doctor right away if you have any of the following:  - Edema (swelling) in your face or hands  - Rapid weight gain-about 1 pound or more in a day  - Headache  - Abdominal pain on your right side  - Vision problems (flashes or spots)  - You have questions or concerns once you return home.    Postop  Birth Instructions    Activity     Do not lift more than 10 pounds for 6 weeks after surgery.  Ask family and friends for help when you need it.  No driving until you have stopped taking your pain medications (usually two weeks after surgery).  No heavy exercise or activity for 6 weeks.  Don't do anything that will put a strain on your surgery site.  Don't strain when using the toilet.  Your care team may prescribe a stool softener if you have problems with your bowel movements.     To care for your incision:     Keep the incision clean and dry.  Do not soak your incision in water. No swimming or hot tubs until it has fully healed. You may soak in the bathtub if the water level is below your incision.  Do not use peroxide, gel, cream, lotion, or ointment on your incision.  Adjust your clothes to avoid pressure on your surgery site (check the elastic in your underwear for example).     You may see a small amount of clear or pink drainage and this is normal.  Check with your health care provider:     If the drainage increases or has an odor.  If the incision reddens, you have swelling, or develop a rash.  If you have increased pain and the medicine we prescribed doesn't help.  If you have a fever above 100.4 F (38 C) with or without chills when placing thermometer under your tongue.   The area around your incision (surgery wound), will feel numb.  This is normal. The numbness should go away in less than a year.     Keep your hands clean:  Always wash your hands before touching your incision (surgery wound). This helps reduce your risk of infection. If your hands aren't dirty, you may use an  alcohol hand-rub to clean your hands. Keep your nails clean and short.    Call your healthcare provider if you have any of these symptoms:     You soak a sanitary pad with blood within 1 hour, or you see blood clots larger than a golf ball.  Bleeding that lasts more than 6 weeks.  Vaginal discharge that smells bad.  Severe pain, cramping or tenderness in your lower belly area.  A need to urinate more frequently (use the toilet more often), more urgently (use the toilet very quickly), or it burns when you urinate.  Nausea and vomiting.  Redness, swelling or pain around a vein in your leg.  Problems breastfeeding or a red or painful area on your breast.  Chest pain and cough or are gasping for air.  Problems with coping with sadness, anxiety or depression. If you have concerns about hurting yourself or the baby, call your provider immediately.    You have questions or concerns after you return home.                  Warning Signs after Having a Baby    Keep this paper on your fridge or somewhere else where you can see it.    Call your provider if you have any of these symptoms up to 12 weeks after having your baby.    Thoughts of hurting yourself or your baby  Pain in your chest or trouble breathing  Severe headache not helped by pain medicine  Eyesight concerns (blurry vision, seeing spots or flashes of light, other changes to eyesight)  Fainting, shaking or other signs of a seizure    Call 9-1-1 if you feel that it is an emergency.     The symptoms below can happen to anyone after giving birth. They can be very serious. Call your provider if you have any of these warning signs.    My provider s phone number: [phone]    Losing too much blood (hemorrhage)    Call your provider if you soak through a pad in less than an hour or pass blood clots bigger than a golf ball. These may be signs that you are bleeding too much.    Blood clots in the legs or lungs    After you give birth, your body naturally clots its blood  to help prevent blood loss. Sometimes this increased clotting can happen in other areas of the body, like the legs or lungs. This can block your blood flow and be very dangerous.     Call your provider if you:  Have a red, swollen spot on the back of your leg that is warm or painful when you touch it.   Are coughing up blood.     Infection    Call your provider if you have any of these symptoms:  Fever of 100.4 F (38 C) or higher.  Pain or redness around your stitches if you had an incision.   Any yellow, white, or green fluid coming from places where you had stitches or surgery.    Mood Problems (postpartum depression)    Many people feel sad or have mood changes after having a baby. But for some people, these mood swings are worse.     Call your provider right away if you feel so anxious or nervous that you can't care for yourself or your baby.    Preeclampsia (high blood pressure)    Even if you didn't have high blood pressure when you were pregnant, you are at risk for the high blood pressure disease called preeclampsia. This risk can last up to 12 weeks after giving birth.     Call your provider if you have:   Pain on your right side under your rib cage  Sudden swelling in the hands and face    Remember: You know your body. If something doesn't feel right, get medical help.     For informational purposes only. Not to replace the advice of your health care provider. Copyright 2020 [facility]. All rights reserved. Clinically reviewed by Moriah Claros, RNC-OB, MSN. [employer] 465171 - Rev 02/23.

## 2023-05-28 NOTE — PLAN OF CARE
Goal Outcome Evaluation:      Vital signs stable.  Pt denies any headache, epigastric pain or visual disturbances at this time. Postpartum assessment WDL. Incision c/d/I with aquacell dressing. Pain controlled with ibuprofen, also given gas x for some distention. Pt does have +3 bilat LE edema.  PCD's on while shes in bed.  Patient ambulating with no assist but visiting baby in NICU via wheelchair. Patient reports passing gas.  Pt is pumping and her milk is coming in. Pt was given shells for sore nipples.  Spouse is very helpful and at bedside.  Patient and infant bonding well. Will continue with current plan of care.  plans to discharge home today after PICC line removal and cont  to monitor bp at home.  Follow up in clinic on Tuesday.  BP cuff and breast pump Rx given to pt before discharge.    D: VSS, assessments WDL.   I: Pt. received complete discharge paperwork and home medications as filled by discharge pharmacy here.  Pt. was given times of last dose for all discharge medications in writing on discharge medication sheets.  Discharge teaching included home medication, pain management, activity restrictions, postpartum cares, and signs and symptoms of infection.    A: Discharge outcomes on care plan met.  Mother states understanding and comfort with self and baby cares.  P: Pt. discharged to home.  Pt. was discharged, accompanied by  and RN, and left with personal belongings.  Pt. to follow up with OB per MD order.  Pt. had no further questions at the time of discharge and no unmet needs were identified.

## 2023-05-28 NOTE — LACTATION NOTE
"Lactation visit with Ifeoma and FOB. Infant born at 30+2 (Ifeoma had HELLP syndrome) and is receiving care in our NICU.    Ifeoma is pumping with our [facility] grade pump, she is pumping every 3 hours and has now adjusted to the Maintain Mode on the pump. She shares her areolas are pretty sore. It appears Ifeoma has been using the 27mm flanges, LC suggested Ifeoma trial the 24mm with her next pumping session, upon inspection, it appears the 24mm will be a more appropriate size. Also suggested Ifeoma could apply some nipple cream to her areola prior to pumping to reduce friction. We also discussed the desired suction strength should be \"strong\" but not painful.    Encouraged Ifeoma to continue tracking her pumping progress in an TEA. Made Ifeoma a hands-free pumping bra.      Ifeoma still needs to call insurance about pump coverage. Discussed pumps available through the hospital.    Discussed ambrocio Paz's developmental progress with feedings/ breastfeeding and encouraged Ifeoma to reach out when lactation needs arise.      Informed Primary RN Ifeoma is looking into pump options this morning. Appreciative of visit.    Darshana Purdy RN, IBCLC            "

## 2023-05-28 NOTE — DISCHARGE SUMMARY
[facility] Discharge Summary    Ifeoma Parisi MRN# [id number]   Age: 23 year old YOB: 2000     Date of Admission:  2023  Date of Discharge::  2023  Admitting Physician:  Johana Hicks MD  Discharge Physician:  Pamela Null MD     Home clinic: [facility]          Admission Diagnoses:   Indication for care in labor and delivery, antepartum [O75.9]  Preeclampsia with severe features, HELLP          Discharge Diagnosis:   Severe pre-eclampsia [O14.10]  High liver enzymes low platelets (HELP) syndome          Procedures:   Procedure(s): Primary low transverse  section       No other significant procedures performed during this admission           Medications Prior to Admission:     Medications Prior to Admission   Medication Sig Dispense Refill Last Dose     Prenatal Vit-Fe Fumarate-FA (PRENATAL MULTIVITAMIN W/IRON) 27-0.8 MG tablet Take 1 tablet by mouth daily   2023     [DISCONTINUED] acetaminophen (TYLENOL) 325 MG tablet Take 650 mg by mouth every 6 hours as needed for mild pain   2023             Discharge Medications:     Current Discharge Medication List      START taking these medications    Details   docusate sodium (COLACE) 100 MG capsule Take 1 capsule (100 mg) by mouth 2 times daily    Associated Diagnoses:  delivery delivered      ferrous sulfate (FEROSUL) 325 (65 Fe) MG tablet Take 1 tablet (325 mg) by mouth daily (with breakfast)    Associated Diagnoses:  delivery delivered      furosemide (LASIX) 20 MG tablet Take 1 tablet (20 mg) by mouth daily for 2 days  Qty: 2 tablet, Refills: 0    Associated Diagnoses: HELLP syndrome (HELLP), third trimester      ibuprofen (ADVIL/MOTRIN) 600 MG tablet Take 1 tablet (600 mg) by mouth every 6 hours as needed for moderate pain    Associated Diagnoses:  delivery delivered      labetalol (NORMODYNE) 200 MG tablet Take 2 tablets (400 mg) by mouth every 8 hours  Qty: 90 tablet,  Refills: 0    Associated Diagnoses: HELLP syndrome (HELLP), third trimester      NIFEdipine ER OSMOTIC (ADALAT CC) 60 MG 24 hr tablet Take 1 tablet (60 mg) by mouth every 12 hours  Qty: 60 tablet, Refills: 0    Associated Diagnoses: HELLP syndrome (HELLP), third trimester         CONTINUE these medications which have CHANGED    Details   acetaminophen (TYLENOL) 500 MG tablet Take 1-2 tablets (500-1,000 mg) by mouth every 6 hours as needed for mild pain    Associated Diagnoses:  delivery delivered         CONTINUE these medications which have NOT CHANGED    Details   Prenatal Vit-Fe Fumarate-FA (PRENATAL MULTIVITAMIN W/IRON) 27-0.8 MG tablet Take 1 tablet by mouth daily                   Consultations:   No consultations were requested during this admission          Brief History of Labor or Admission:   Admitted from clinic with elevated BPs at 30+0.  Received BP tx and betamethasone.  BPs stabilized but labs continued to worsen so delivery was scheduled 48h s/p BMZ.  PLTCS was uncomplicated.  See op note for details.            Hospital Course:   The patient's hospital course was notable for need for magnesium, BP mgmt. Labs begin to trend towards normal on POD #2.  She recovered as anticipated and experienced no post-operative complications.  On discharge, her pain was well controlled. Vaginal bleeding is similar to peak menstrual flow.  Voiding without difficulty.  Ambulating well and tolerating a normal diet.  No fever or significant wound drainage.  Pumping well.  Infant is stable in NICU.  No bowel movement yet. She was discharged on post-partum day #4.    Post-partum hemoglobin:   Hemoglobin   Date Value Ref Range Status   2023 9.7 (L) 11.7 - 15.7 g/dL Final             Discharge Instructions and Follow-Up:   Discharge diet: Regular   Discharge activity: Lifting restricted to 20 pounds  No heavy lifting, pushing, pulling for 6 week(s)  No driving or operating machinery while on narcotic  analgesics  Pelvic rest: abstain from intercourse and do not use tampons for 6 week(s)   Discharge follow-up: Follow up with Dr. Way in 2 days   Wound care: Drink plenty of fluids  Ice to area for comfort  Keep wound clean and dry  Dressing off on POD #7           Discharge Disposition:   Discharged to home      Attestation:  I have reviewed today's vital signs, notes, medications, labs and imaging.    Pamela Null MD

## 2023-05-28 NOTE — PLAN OF CARE
VSS ex 's/80's- 120's/80's. Pt denies symptoms of preeclampsia, reflexes and clonus WNL. Incision C/D/I with aquacell dressing. Strict I&O's. Pain controlled with  ibuprofen.  Abdominal binder in use. Pumping every 3hours for infant in NICU. Continue with plan of care and notify provider as needed.

## 2023-05-28 NOTE — PLAN OF CARE
VSS.  Pain managed with tylenol and ibuprofen. Up independently in room, voiding without difficulty. Working on breastfeeding  and supplementing with bottle and sim. Clonus and reflexes WNL. No visual changes, headaches or epigastric pain . Continue with plan of care and notify MD as needed.

## 2023-05-30 LAB
PATH REPORT.COMMENTS IMP SPEC: NORMAL
PATH REPORT.COMMENTS IMP SPEC: NORMAL
PATH REPORT.FINAL DX SPEC: NORMAL
PATH REPORT.GROSS SPEC: NORMAL
PATH REPORT.MICROSCOPIC SPEC OTHER STN: NORMAL
PATH REPORT.RELEVANT HX SPEC: NORMAL
PHOTO IMAGE: NORMAL

## 2023-06-19 ENCOUNTER — OFFICE VISIT (OUTPATIENT)
Dept: URGENT CARE | Facility: URGENT CARE | Age: 23
End: 2023-06-19
Payer: COMMERCIAL

## 2023-06-19 ENCOUNTER — NURSE TRIAGE (OUTPATIENT)
Dept: NURSING | Facility: CLINIC | Age: 23
End: 2023-06-19

## 2023-06-19 VITALS
RESPIRATION RATE: 14 BRPM | OXYGEN SATURATION: 97 % | BODY MASS INDEX: 38.28 KG/M2 | SYSTOLIC BLOOD PRESSURE: 130 MMHG | TEMPERATURE: 98.2 F | DIASTOLIC BLOOD PRESSURE: 80 MMHG | HEART RATE: 107 BPM | WEIGHT: 223 LBS

## 2023-06-19 DIAGNOSIS — N64.4 BREAST PAIN, LEFT: Primary | ICD-10-CM

## 2023-06-19 PROCEDURE — 99203 OFFICE O/P NEW LOW 30 MIN: CPT | Performed by: PHYSICIAN ASSISTANT

## 2023-06-19 ASSESSMENT — ENCOUNTER SYMPTOMS
CHILLS: 1
FEVER: 0
WOUND: 0

## 2023-06-19 NOTE — PATIENT INSTRUCTIONS
Continue to pump frequently.     warm compresses or showers, and manual massage   Analgesics such as ibuprofen and acetaminophen may decrease the discomfort.

## 2023-06-19 NOTE — PROGRESS NOTES
Assessment & Plan:        ICD-10-CM    1. Breast pain, left  N64.4       2. Lactating mother  Z39.1             Plan/Clinical Decision Making:    Patient presents with left breast pain for the past 2 days.  Patient delivered son 4 weeks ago at 30 weeks.  Patient has been exclusively pumping since infant is in the NICU.  On exam has area of firmness of left breast consistent with plugged milk duct.   Consider consult with lactation specialist if not improving.   Discussed signs of mastitis and if worsening symptoms come in for follow up.     Patient Instructions     Continue to pump frequently.     warm compresses or showers, and manual massage   Analgesics such as ibuprofen and acetaminophen may decrease the discomfort.        Return if symptoms worsen or fail to improve, for in 5-7 days.     At the end of the encounter, I discussed results, diagnosis, medications. Discussed red flags for immediate return to clinic/ER, as well as indications for follow up if no improvement. Patient understood and agreed to plan. Patient was stable for discharge.        Bailey Haynes PA-C on 6/19/2023 at 6:42 PM          Subjective:     HPI:    Ifeoma is a 23 year old female who presents to clinic today for the following health issues:  Chief Complaint   Patient presents with     Pain     Pain or inflamation- possible mastitis  x 2 days ago and getting worse -- ON the left side and tried hot compresses  - GAVE birth on May 24     HPI    Left breast with pain, mild swelling of left breast. Using hot compresses. Had some chills today and yesterday.   Tried ibuprofen not helping much. Patient pumping. Baby in NICU. Born on May 24th.   No bloody or purulent discharge, normal appearing expressed milk.     History obtained from the patient.    Review of Systems   Constitutional: Positive for chills. Negative for fever.   Skin: Negative for rash and wound.         Patient Active Problem List   Diagnosis     Preeclampsia, severe, third  trimester     Obesity     Indication for care in labor and delivery, antepartum     HELLP syndrome (HELLP), third trimester      delivery delivered     HELLP syndrome        Past Medical History:   Diagnosis Date     Depressive disorder     Anxiety       Social History     Tobacco Use     Smoking status: Never     Smokeless tobacco: Never   Vaping Use     Vaping status: Never Used   Substance Use Topics     Alcohol use: Not Currently             Objective:     Vitals:    23 1818   BP: 130/80   BP Location: Right arm   Patient Position: Chair   Cuff Size: Adult Regular   Pulse: 107   Resp: 14   Temp: 98.2  F (36.8  C)   TempSrc: Oral   SpO2: 97%   Weight: 101.2 kg (223 lb)         Physical Exam   EXAM:   Pleasant, alert, appropriate appearance. NAD.  Head Exam: Normocephalic, atraumatic.  Eye Exam:   non icteric/injection.    Ext/musculoskeletal: Grossly intact, No edema  Left breast with area of firmness, no erythema, tender at at 4 to 5 o'clock mid breast.   Skin: no rash or lesion.      Results:  No results found for any visits on 23.

## 2023-06-20 ENCOUNTER — HOSPITAL ENCOUNTER (OUTPATIENT)
Facility: CLINIC | Age: 23
End: 2023-06-20
Attending: OBSTETRICS & GYNECOLOGY | Admitting: OBSTETRICS & GYNECOLOGY
Payer: COMMERCIAL

## 2023-06-20 NOTE — TELEPHONE ENCOUNTER
Nurse Triage SBAR    Is this a 2nd Level Triage? YES, LICENSED PRACTITIONER REVIEW IS REQUIRED    Situation: Fever    Background: Patient was seen earlier today at urgent care for what she thought may be mastitis and now patient has a fever. Patients temperature is 102 oral.  Patient has taken ibuprofen.    Assessment: Fever 102 oral    Protocol Recommended Disposition:   See HCP Within 4 Hours (Or PCP Triage)    Recommendation: Patient verbalized understanding of care advice    Magui Andersen RN on 6/19/2023 at 10:42 PM      Does the patient meet one of the following criteria for ADS visit consideration? NoFever 102. oral    Reason for Disposition    [1] Breast looks infected (e.g., spreading redness, feels hot or painful to touch) AND [2] fever > 100.4 F (38.0 C)    Additional Information    Negative: Sounds like a life-threatening emergency to the triager    Negative: [1] SEVERE breast pain AND [2] fever > 103 F (39.4 C)    Negative: Patient sounds very sick or weak to the triager    Protocols used: POSTPARTUM - BREAST PAIN AND [id number]

## 2023-06-28 ENCOUNTER — LACTATION ENCOUNTER (OUTPATIENT)
Age: 23
End: 2023-06-28

## 2023-06-28 NOTE — LACTATION NOTE
This note was copied from a baby's chart.  Routine visit.   Baby able to latch on well with shield and nutritive suckling pattern noted.  Shown to express gtts prior to feeding and place on the outside of shield.  Ifeoma states she has been pumping 500ml and feels her milk supply is starting to go down, instructed to hand express with pumping sessions and add a short session.  Pump 8 or more time in 24 hours.  Will follow as needed. Kiara Goncalves BSN, RN, PHN, RNC-MNN, IBCLC

## 2023-07-25 ENCOUNTER — LACTATION ENCOUNTER (OUTPATIENT)
Age: 23
End: 2023-07-25

## 2023-08-01 ENCOUNTER — DOCUMENTATION ONLY (OUTPATIENT)
Dept: CARE COORDINATION | Facility: CLINIC | Age: 23
End: 2023-08-01
Payer: COMMERCIAL

## 2023-08-01 NOTE — PROGRESS NOTES
EPDS was completed in the NICU today as part of our routine assessment at child's 2 month check in. Depression score was 3 and anxiety score was 1 with negative screen for suicidal ideation.     Copy of EPDS was given to Ifeoma today as well as educational material about  mood and anxiety disorders.     Recommendation:   Follow up at next routine PMAD screening interval..    Sydnie agreed to this recommendation at this time.     Marycruz BURKETT, MSW, [facility]  Maternal Child Health   [phone]--office  [phone]--pager

## 2023-10-22 ENCOUNTER — HEALTH MAINTENANCE LETTER (OUTPATIENT)
Age: 23
End: 2023-10-22

## 2024-03-07 ENCOUNTER — TRANSFERRED RECORDS (OUTPATIENT)
Dept: HEALTH INFORMATION MANAGEMENT | Facility: CLINIC | Age: 24
End: 2024-03-07

## 2024-03-07 LAB
ALT SERPL-CCNC: 28 U/L (ref 6–29)
AST SERPL-CCNC: 15 U/L (ref 10–30)
CREATININE (EXTERNAL): 0.65 MG/DL (ref 0.5–0.96)
GFR ESTIMATED (EXTERNAL): 126 ML/MIN/1.73M2
GLUCOSE (EXTERNAL): 91 MG/DL (ref 65–99)
HBA1C MFR BLD: 5.52 %
POTASSIUM (EXTERNAL): 4.5 MMOL/L (ref 3.5–5.3)

## 2024-04-30 ENCOUNTER — TRANSFERRED RECORDS (OUTPATIENT)
Dept: HEALTH INFORMATION MANAGEMENT | Facility: CLINIC | Age: 24
End: 2024-04-30
Payer: COMMERCIAL

## 2024-04-30 LAB — TSH SERPL-ACNC: 3.77 MIU/L (ref 0.4–4.5)

## 2024-05-06 ENCOUNTER — TRANSFERRED RECORDS (OUTPATIENT)
Dept: HEALTH INFORMATION MANAGEMENT | Facility: CLINIC | Age: 24
End: 2024-05-06
Payer: COMMERCIAL

## 2024-05-07 ENCOUNTER — MEDICAL CORRESPONDENCE (OUTPATIENT)
Dept: HEALTH INFORMATION MANAGEMENT | Facility: CLINIC | Age: 24
End: 2024-05-07
Payer: COMMERCIAL

## 2024-05-10 ENCOUNTER — TRANSCRIBE ORDERS (OUTPATIENT)
Dept: OTHER | Age: 24
End: 2024-05-10

## 2024-05-10 DIAGNOSIS — E06.3 HASHIMOTO'S THYROIDITIS: Primary | ICD-10-CM

## 2024-05-10 DIAGNOSIS — R73.03 PREDIABETES: ICD-10-CM

## 2024-05-14 ENCOUNTER — TELEPHONE (OUTPATIENT)
Dept: ENDOCRINOLOGY | Facility: CLINIC | Age: 24
End: 2024-05-14
Payer: COMMERCIAL

## 2024-05-14 NOTE — TELEPHONE ENCOUNTER
Patient confirmed scheduled appointment:  Date: 6/4   Time: 9 am   Visit type: New Endocrine  Provider: Jovita   Location: [facility]  Testing/imaging: NA   Additional notes: Spoke to pt and scheduled soonest avail appt per below   Alma Cruz, RN  [id number]  Please contact and schedule  NEW urgent  ROBI or consult within 1-2 weeks per protocol    Veronique Olivier on 5/14/2024 at 9:09 AM

## 2024-05-20 NOTE — PROGRESS NOTES
Outcome for 05/20/24 8:54 AM: Onfant message sent  Nancy Garcia MA  Outcome for 05/31/24 1:23 PM: Patient is not checking blood sugars  Nancy Garcia MA

## 2024-06-04 ENCOUNTER — PRE VISIT (OUTPATIENT)
Dept: ENDOCRINOLOGY | Facility: CLINIC | Age: 24
End: 2024-06-04

## 2024-06-04 ENCOUNTER — VIRTUAL VISIT (OUTPATIENT)
Dept: ENDOCRINOLOGY | Facility: CLINIC | Age: 24
End: 2024-06-04
Attending: FAMILY MEDICINE
Payer: COMMERCIAL

## 2024-06-04 DIAGNOSIS — E06.3 HASHIMOTO'S THYROIDITIS: Primary | ICD-10-CM

## 2024-06-04 PROCEDURE — 99204 OFFICE O/P NEW MOD 45 MIN: CPT | Mod: 95 | Performed by: INTERNAL MEDICINE

## 2024-06-04 ASSESSMENT — PAIN SCALES - GENERAL: PAINLEVEL: NO PAIN (0)

## 2024-06-04 NOTE — NURSING NOTE
Is the patient currently in the state of [place]? YES    Visit mode:VIDEO    If the visit is dropped, the patient can be reconnected by: VIDEO VISIT: Text to cell phone:   Telephone Information:   Mobile [phone]       Will anyone else be joining the visit? NO  (If patient encounters technical issues they should call [phone]    How would you like to obtain your AVS? MyChart    Are changes needed to the allergy or medication list? No    Are refills needed on medications prescribed by this physician? NO    Reason for visit: Consult    Shelby Kocher VVF
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
## 2024-06-06 ENCOUNTER — TRANSFERRED RECORDS (OUTPATIENT)
Dept: HEALTH INFORMATION MANAGEMENT | Facility: CLINIC | Age: 24
End: 2024-06-06
Payer: COMMERCIAL

## 2024-06-09 ENCOUNTER — MYC MEDICAL ADVICE (OUTPATIENT)
Dept: ENDOCRINOLOGY | Facility: CLINIC | Age: 24
End: 2024-06-09

## 2024-06-09 DIAGNOSIS — E03.9 HYPOTHYROIDISM, UNSPECIFIED TYPE: Primary | ICD-10-CM

## 2024-06-24 ENCOUNTER — TELEPHONE (OUTPATIENT)
Dept: ENDOCRINOLOGY | Facility: CLINIC | Age: 24
End: 2024-06-24
Payer: COMMERCIAL

## 2024-06-24 DIAGNOSIS — E03.9 HYPOTHYROIDISM, UNSPECIFIED TYPE: ICD-10-CM

## 2024-06-24 RX ORDER — LEVOTHYROXINE SODIUM 75 UG/1
75 TABLET ORAL DAILY
Qty: 90 TABLET | Refills: 1 | Status: SHIPPED | OUTPATIENT
Start: 2024-06-24 | End: 2024-06-24

## 2024-06-24 RX ORDER — LEVOTHYROXINE SODIUM 75 UG/1
75 TABLET ORAL DAILY
Qty: 90 TABLET | Refills: 1 | Status: SHIPPED | OUTPATIENT
Start: 2024-06-24

## 2024-06-24 RX ORDER — LEVOTHYROXINE SODIUM 75 UG/1
75 TABLET ORAL DAILY
Qty: 90 TABLET | Refills: 1 | Status: CANCELLED | OUTPATIENT
Start: 2024-06-24

## 2024-06-24 NOTE — TELEPHONE ENCOUNTER
[facility] Call Center    Phone Message    May a detailed message be left on voicemail: yes     Reason for Call: Other: Patient is calling to request her medication    levothyroxine (SYNTHROID/LEVOTHROID) 75 MCG tablet  to be [place] [facility] [address] [place] [address]     Action Taken: Message routed to:  [facility] endo    Travel Screening: Not Applicable     Date of Service:

## 2024-06-24 NOTE — TELEPHONE ENCOUNTER
levothyroxine (SYNTHROID/LEVOTHROID) 75 MCG tablet   Disp-90 tablet, R-1   Start: 06/24/2024 6/4/2024  [facility] [place]    Kitty Hussein MD  Endocrinology, Diabetes, and Metabolism

## 2024-06-26 ENCOUNTER — TELEPHONE (OUTPATIENT)
Dept: ENDOCRINOLOGY | Facility: CLINIC | Age: 24
End: 2024-06-26
Payer: COMMERCIAL

## 2024-06-26 NOTE — TELEPHONE ENCOUNTER
Left Voicemail (1st Attempt) and Sent Mychart (1st Attempt) for the patient to call back and schedule the following:    Appointment type: Return endo  Provider: Jovita  Return date: 3 months (around 9/4)  Specialty phone number: [phone]  Additional appointment(s) needed: NA  Additonal Notes: OK to use ROBI

## 2024-07-01 NOTE — TELEPHONE ENCOUNTER
Left Voicemail (2nd Attempt) and Sent Mychart (2nd Attempt) for the patient to call back and schedule the following:    Appointment type: Return endo  Provider: Jovita  Return date: 3 months (around 9/4)  Specialty phone number: [phone]  Additional appointment(s) needed: NA  Additonal Notes: OK to use ROBI    LVM x2/MyC x2

## 2024-07-05 ENCOUNTER — TELEPHONE (OUTPATIENT)
Dept: ENDOCRINOLOGY | Facility: CLINIC | Age: 24
End: 2024-07-05
Payer: COMMERCIAL

## 2024-07-05 DIAGNOSIS — E06.3 HASHIMOTO'S THYROIDITIS: Primary | ICD-10-CM

## 2024-07-18 ENCOUNTER — LAB (OUTPATIENT)
Dept: LAB | Facility: CLINIC | Age: 24
End: 2024-07-18
Payer: COMMERCIAL

## 2024-07-18 DIAGNOSIS — E03.9 HYPOTHYROIDISM, UNSPECIFIED TYPE: ICD-10-CM

## 2024-07-18 PROCEDURE — 36415 COLL VENOUS BLD VENIPUNCTURE: CPT

## 2024-07-18 PROCEDURE — 84443 ASSAY THYROID STIM HORMONE: CPT

## 2024-07-18 PROCEDURE — 84439 ASSAY OF FREE THYROXINE: CPT

## 2024-07-19 LAB
T4 FREE SERPL-MCNC: 1.35 NG/DL (ref 0.9–1.7)
TSH SERPL DL<=0.005 MIU/L-ACNC: 2.2 UIU/ML (ref 0.3–4.2)

## 2024-11-11 ENCOUNTER — VIRTUAL VISIT (OUTPATIENT)
Dept: ENDOCRINOLOGY | Facility: CLINIC | Age: 24
End: 2024-11-11
Payer: COMMERCIAL

## 2024-11-11 DIAGNOSIS — O24.410 DIET CONTROLLED GESTATIONAL DIABETES MELLITUS (GDM) IN THIRD TRIMESTER: ICD-10-CM

## 2024-11-11 DIAGNOSIS — E06.3 HASHIMOTO'S THYROIDITIS: Primary | ICD-10-CM

## 2024-11-11 DIAGNOSIS — E03.9 HYPOTHYROIDISM, UNSPECIFIED TYPE: ICD-10-CM

## 2024-11-11 RX ORDER — LEVOTHYROXINE SODIUM 75 UG/1
75 TABLET ORAL DAILY
Qty: 90 TABLET | Refills: 0 | Status: SHIPPED | OUTPATIENT
Start: 2024-11-11

## 2024-11-11 RX ORDER — ASPIRIN 81 MG/1
81 TABLET ORAL
COMMUNITY
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
## 2024-11-11 NOTE — NURSING NOTE
Current patient location: [address]  [place] [address]    Is the patient currently in the state of [place]? YES    Visit mode:VIDEO    If the visit is dropped, the patient can be reconnected by: VIDEO VISIT: Text to cell phone:   Telephone Information:   Mobile [phone]       Will anyone else be joining the visit? NO  (If patient encounters technical issues they should call [phone])    Are changes needed to the allergy or medication list? No    Are refills needed on medications prescribed by this physician? NO    Rooming Documentation:  Not applicable    Reason for visit: RECHECK (Follow-up endo )    Jackie JAYF
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
## 2024-12-07 ENCOUNTER — HOSPITAL ENCOUNTER (OUTPATIENT)
Facility: CLINIC | Age: 24
Discharge: HOME | End: 2024-12-07
Attending: OBSTETRICS & GYNECOLOGY | Admitting: OBSTETRICS & GYNECOLOGY
Payer: COMMERCIAL

## 2024-12-07 VITALS — TEMPERATURE: 98.3 F | RESPIRATION RATE: 18 BRPM | SYSTOLIC BLOOD PRESSURE: 131 MMHG | DIASTOLIC BLOOD PRESSURE: 75 MMHG

## 2024-12-07 PROBLEM — Z36.89 ENCOUNTER FOR TRIAGE IN PREGNANT PATIENT: Status: ACTIVE | Noted: 2024-12-07

## 2024-12-07 PROCEDURE — G0463 HOSPITAL OUTPT CLINIC VISIT: HCPCS | Mod: 25

## 2024-12-07 PROCEDURE — 59025 FETAL NON-STRESS TEST: CPT

## 2024-12-07 RX ORDER — FERROUS SULFATE 325(65) MG
325 TABLET ORAL
COMMUNITY

## 2024-12-07 RX ORDER — SERTRALINE HYDROCHLORIDE 25 MG/1
25 TABLET, FILM COATED ORAL DAILY
COMMUNITY

## 2024-12-07 RX ORDER — DOCUSATE SODIUM 100 MG/1
100 CAPSULE, LIQUID FILLED ORAL 2 TIMES DAILY
COMMUNITY

## 2024-12-07 ASSESSMENT — ACTIVITIES OF DAILY LIVING (ADL): ADLS_ACUITY_SCORE: 29

## 2024-12-08 NOTE — DISCHARGE INSTRUCTIONS
Learning About When to Call Your Doctor During Pregnancy (After 20 Weeks)  Overview  It's common to have concerns about what might be a problem when you're pregnant. Most pregnancies don't have any serious problems. But it's still important to know when to call your doctor if you have certain symptoms or signs of labor.  These are general suggestions. Your doctor may give you some more information about when to call.  When to call your doctor (after 20 weeks)  Call 911  anytime you think you may need emergency care. For example, call if:  You have severe vaginal bleeding. This means you are soaking through a pad each hour for 2 or more hours.  You have sudden, severe pain in your belly.  You have chest pain, are short of breath, or cough up blood.  You passed out (lost consciousness).  You have a seizure.  You see or feel the umbilical cord.  You think you are about to deliver your baby and can't make it safely to the hospital or birthing center.  Call your doctor now or seek immediate medical care if:  You have vaginal bleeding.  You have belly pain.  You have a fever.  You are dizzy or lightheaded, or you feel like you may faint.  You have signs of a blood clot in your leg (called a deep vein thrombosis), such as:  Pain in the calf, back of the knee, thigh, or groin.  Swelling in your leg or groin.  A color change on the leg or groin. The skin may be reddish or purplish, depending on your usual skin color.  You have symptoms of preeclampsia, such as:  Sudden swelling of your face, hands, or feet.  New vision problems (such as dimness, blurring, or seeing spots).  A severe headache.  You have a sudden release of fluid from your vagina. (You think your water broke.)  You've been having regular contractions for an hour. This means that you've had at least 6 contractions within 1 hour, even after you change your position and drink fluids.  You notice that your baby has stopped moving or is moving less than  "normal.  You have signs of heart failure, such as:  New or increased shortness of breath.  New or worse swelling in your legs, ankles, or feet.  Sudden weight gain, such as more than 2 to 3 pounds in a day or 5 pounds in a week.  Feeling so tired or weak that you cannot do your usual activities.  You have symptoms of a urinary tract infection. These may include:  Pain or burning when you urinate.  A frequent need to urinate without being able to pass much urine.  Pain in the flank, which is just below the rib cage and above the waist on either side of the back.  Blood in your urine.  Watch closely for changes in your health, and be sure to contact your doctor if:  You have vaginal discharge that smells bad.  You feel sad, anxious, or hopeless for more than a few days.  You have skin changes, such as a rash, itching, or a yellow color to your skin.  You have other concerns about your pregnancy.  If you have labor signs at 37 weeks or more  If you have signs of labor at 37 weeks or more, your doctor may tell you to call when your labor becomes more active. Symptoms of active labor include:  Contractions that are regular.  Contractions that are less than 5 minutes apart.  Contractions that are hard to talk through.  Follow-up care is a key part of your treatment and safety. Be sure to make and go to all appointments, and call your doctor if you are having problems. It's also a good idea to know your test results and keep a list of the medicines you take.  Where can you learn more?  Go to https://www.ubigrate.net/patiented  Enter N531 in the search box to learn more about \"Learning About When to Call Your Doctor During Pregnancy (After 20 Weeks).\"  Current as of: July 10, 2023  Content Version: 14.2 2024 [facility].   Care instructions adapted under license by your healthcare professional. If you have questions about a medical condition or this instruction, always ask your healthcare professional. " [employer] disclaims any warranty or liability for your use of this information.

## 2024-12-08 NOTE — PLAN OF CARE
Ifeoma presents to [facility] ambulatory and accompanied by her  Marcelo for evaluation of decreased fetal movement. Pt to MAC 2, oriented to room and call light. Monitors applied with verbal consent. Fht's present.    Ifeoma states that she has felt baby move today but that it seems much ess than what's normal for her, She denies headache, visual changes, epigastric pain, intermittent abdominal pain, low back pain, pelvic pressure, VB, LOF, dysuria, vaginal irritation or recent illness.     Admission database completed and physical assessment obtained. VSS    Dr. Barrera updated regarding Fht's 125 with moderate variabiity, accels present and no decels. Presumed adequate fetal oxygenation at this time. Discharge order received.     Discharge instructions reviewed with Ifeoma and Marcelo. Questions answered. Ifeoma verbalizes understanding and agreement. Discharged home ambulatory.

## 2024-12-15 ENCOUNTER — HEALTH MAINTENANCE LETTER (OUTPATIENT)
Age: 24
End: 2024-12-15

## 2024-12-18 PROBLEM — E03.9 HYPOTHYROIDISM: Status: ACTIVE | Noted: 2024-12-18

## 2024-12-18 PROBLEM — Z87.59 HISTORY OF SEVERE PRE-ECLAMPSIA: Status: ACTIVE | Noted: 2024-12-18

## 2024-12-18 PROBLEM — O34.219 PREVIOUS CESAREAN DELIVERY, ANTEPARTUM CONDITION OR COMPLICATION: Status: ACTIVE | Noted: 2024-12-18

## 2024-12-18 PROBLEM — F41.9 ANXIETY: Status: ACTIVE | Noted: 2024-12-18

## 2024-12-18 PROBLEM — Z87.59 HISTORY OF SEVERE PRE-ECLAMPSIA: Status: RESOLVED | Noted: 2024-12-18 | Resolved: 2024-12-18

## 2024-12-18 PROBLEM — O24.410 DIET CONTROLLED GESTATIONAL DIABETES MELLITUS (GDM): Status: ACTIVE | Noted: 2024-12-18

## 2024-12-18 PROBLEM — O09.299 HISTORY OF HELLP SYNDROME, CURRENTLY PREGNANT: Status: ACTIVE | Noted: 2024-12-18

## 2024-12-27 ENCOUNTER — HOSPITAL ENCOUNTER (INPATIENT)
Facility: CLINIC | Age: 24
LOS: 2 days | Discharge: HOME | End: 2024-12-29
Attending: OBSTETRICS & GYNECOLOGY | Admitting: OBSTETRICS & GYNECOLOGY
Payer: COMMERCIAL

## 2024-12-27 DIAGNOSIS — Z87.59 HISTORY OF SEVERE PRE-ECLAMPSIA: ICD-10-CM

## 2024-12-27 PROBLEM — R74.8 ELEVATED LIVER ENZYMES: Status: ACTIVE | Noted: 2024-12-27

## 2024-12-27 LAB
ABO + RH BLD: NORMAL
ALT SERPL W P-5'-P-CCNC: 95 U/L (ref 0–50)
AST SERPL W P-5'-P-CCNC: 35 U/L (ref 0–45)
BASOPHILS # BLD AUTO: 0 10E3/UL (ref 0–0.2)
BASOPHILS NFR BLD AUTO: 0 %
BLD GP AB SCN SERPL QL: NEGATIVE
CREAT SERPL-MCNC: 0.49 MG/DL (ref 0.51–0.95)
EGFRCR SERPLBLD CKD-EPI 2021: >90 ML/MIN/1.73M2
EOSINOPHIL # BLD AUTO: 0.2 10E3/UL (ref 0–0.7)
EOSINOPHIL NFR BLD AUTO: 2 %
ERYTHROCYTE [DISTWIDTH] IN BLOOD BY AUTOMATED COUNT: 13.7 % (ref 10–15)
GLUCOSE SERPL-MCNC: 96 MG/DL (ref 70–99)
HCT VFR BLD AUTO: 35.2 % (ref 35–47)
HGB BLD-MCNC: 11.9 G/DL (ref 11.7–15.7)
IMM GRANULOCYTES # BLD: 0.1 10E3/UL
IMM GRANULOCYTES NFR BLD: 1 %
LYMPHOCYTES # BLD AUTO: 2.3 10E3/UL (ref 0.8–5.3)
LYMPHOCYTES NFR BLD AUTO: 24 %
MCH RBC QN AUTO: 31.3 PG (ref 26.5–33)
MCHC RBC AUTO-ENTMCNC: 33.8 G/DL (ref 31.5–36.5)
MCV RBC AUTO: 93 FL (ref 78–100)
MONOCYTES # BLD AUTO: 0.5 10E3/UL (ref 0–1.3)
MONOCYTES NFR BLD AUTO: 5 %
NEUTROPHILS # BLD AUTO: 6.6 10E3/UL (ref 1.6–8.3)
NEUTROPHILS NFR BLD AUTO: 68 %
NRBC # BLD AUTO: 0 10E3/UL
NRBC BLD AUTO-RTO: 0 /100
PLATELET # BLD AUTO: 212 10E3/UL (ref 150–450)
RBC # BLD AUTO: 3.8 10E6/UL (ref 3.8–5.2)
SPECIMEN EXP DATE BLD: NORMAL
T PALLIDUM AB SER QL: NONREACTIVE
WBC # BLD AUTO: 9.8 10E3/UL (ref 4–11)

## 2024-12-27 PROCEDURE — 120N000012 HC R&B POSTPARTUM

## 2024-12-27 PROCEDURE — 36415 COLL VENOUS BLD VENIPUNCTURE: CPT | Performed by: OBSTETRICS & GYNECOLOGY

## 2024-12-27 PROCEDURE — 86900 BLOOD TYPING SEROLOGIC ABO: CPT | Performed by: OBSTETRICS & GYNECOLOGY

## 2024-12-27 PROCEDURE — 250N000013 HC RX MED GY IP 250 OP 250 PS 637: Performed by: OBSTETRICS & GYNECOLOGY

## 2024-12-27 PROCEDURE — 370N000017 HC ANESTHESIA TECHNICAL FEE, PER MIN: Performed by: OBSTETRICS & GYNECOLOGY

## 2024-12-27 PROCEDURE — 250N000011 HC RX IP 250 OP 636: Performed by: OBSTETRICS & GYNECOLOGY

## 2024-12-27 PROCEDURE — 258N000003 HC RX IP 258 OP 636: Performed by: SPECIALIST

## 2024-12-27 PROCEDURE — 85004 AUTOMATED DIFF WBC COUNT: CPT | Performed by: OBSTETRICS & GYNECOLOGY

## 2024-12-27 PROCEDURE — 84450 TRANSFERASE (AST) (SGOT): CPT | Performed by: OBSTETRICS & GYNECOLOGY

## 2024-12-27 PROCEDURE — 710N000009 HC RECOVERY PHASE 1, LEVEL 1, PER MIN: Performed by: OBSTETRICS & GYNECOLOGY

## 2024-12-27 PROCEDURE — 85014 HEMATOCRIT: CPT | Performed by: OBSTETRICS & GYNECOLOGY

## 2024-12-27 PROCEDURE — 84460 ALANINE AMINO (ALT) (SGPT): CPT | Performed by: OBSTETRICS & GYNECOLOGY

## 2024-12-27 PROCEDURE — 360N000076 HC SURGERY LEVEL 3, PER MIN: Performed by: OBSTETRICS & GYNECOLOGY

## 2024-12-27 PROCEDURE — 86780 TREPONEMA PALLIDUM: CPT | Performed by: OBSTETRICS & GYNECOLOGY

## 2024-12-27 PROCEDURE — 86850 RBC ANTIBODY SCREEN: CPT | Performed by: OBSTETRICS & GYNECOLOGY

## 2024-12-27 PROCEDURE — 82565 ASSAY OF CREATININE: CPT | Performed by: OBSTETRICS & GYNECOLOGY

## 2024-12-27 PROCEDURE — 258N000003 HC RX IP 258 OP 636: Performed by: OBSTETRICS & GYNECOLOGY

## 2024-12-27 PROCEDURE — 82947 ASSAY GLUCOSE BLOOD QUANT: CPT | Performed by: OBSTETRICS & GYNECOLOGY

## 2024-12-27 PROCEDURE — 272N000001 HC OR GENERAL SUPPLY STERILE: Performed by: OBSTETRICS & GYNECOLOGY

## 2024-12-27 PROCEDURE — 250N000013 HC RX MED GY IP 250 OP 250 PS 637: Performed by: SPECIALIST

## 2024-12-27 RX ORDER — LOPERAMIDE HYDROCHLORIDE 2 MG/1
2 CAPSULE ORAL
Status: DISCONTINUED | OUTPATIENT
Start: 2024-12-27 | End: 2024-12-29 | Stop reason: HOSPADM

## 2024-12-27 RX ORDER — PROCHLORPERAZINE MALEATE 5 MG/1
10 TABLET ORAL EVERY 6 HOURS PRN
Status: DISCONTINUED | OUTPATIENT
Start: 2024-12-27 | End: 2024-12-27 | Stop reason: HOSPADM

## 2024-12-27 RX ORDER — SODIUM PHOSPHATE,MONO-DIBASIC 19G-7G/118
1 ENEMA (ML) RECTAL DAILY PRN
Status: DISCONTINUED | OUTPATIENT
Start: 2024-12-29 | End: 2024-12-29 | Stop reason: HOSPADM

## 2024-12-27 RX ORDER — HYDROCORTISONE 25 MG/G
CREAM TOPICAL 3 TIMES DAILY PRN
Status: DISCONTINUED | OUTPATIENT
Start: 2024-12-27 | End: 2024-12-29 | Stop reason: HOSPADM

## 2024-12-27 RX ORDER — ONDANSETRON 4 MG/1
4 TABLET, ORALLY DISINTEGRATING ORAL EVERY 6 HOURS PRN
Status: DISCONTINUED | OUTPATIENT
Start: 2024-12-27 | End: 2024-12-29 | Stop reason: HOSPADM

## 2024-12-27 RX ORDER — METOCLOPRAMIDE 10 MG/1
10 TABLET ORAL EVERY 6 HOURS PRN
Status: DISCONTINUED | OUTPATIENT
Start: 2024-12-27 | End: 2024-12-29 | Stop reason: HOSPADM

## 2024-12-27 RX ORDER — NALOXONE HYDROCHLORIDE 0.4 MG/ML
0.4 INJECTION, SOLUTION INTRAMUSCULAR; INTRAVENOUS; SUBCUTANEOUS
Status: DISCONTINUED | OUTPATIENT
Start: 2024-12-27 | End: 2024-12-29 | Stop reason: HOSPADM

## 2024-12-27 RX ORDER — CARBOPROST TROMETHAMINE 250 UG/ML
250 INJECTION, SOLUTION INTRAMUSCULAR
Status: DISCONTINUED | OUTPATIENT
Start: 2024-12-27 | End: 2024-12-29 | Stop reason: HOSPADM

## 2024-12-27 RX ORDER — METOCLOPRAMIDE HYDROCHLORIDE 5 MG/ML
10 INJECTION INTRAMUSCULAR; INTRAVENOUS EVERY 6 HOURS PRN
Status: DISCONTINUED | OUTPATIENT
Start: 2024-12-27 | End: 2024-12-29 | Stop reason: HOSPADM

## 2024-12-27 RX ORDER — MORPHINE SULFATE 1 MG/ML
150 INJECTION, SOLUTION EPIDURAL; INTRATHECAL; INTRAVENOUS ONCE
Status: DISCONTINUED | OUTPATIENT
Start: 2024-12-27 | End: 2024-12-27 | Stop reason: HOSPADM

## 2024-12-27 RX ORDER — NALOXONE HYDROCHLORIDE 0.4 MG/ML
0.2 INJECTION, SOLUTION INTRAMUSCULAR; INTRAVENOUS; SUBCUTANEOUS
Status: DISCONTINUED | OUTPATIENT
Start: 2024-12-27 | End: 2024-12-29 | Stop reason: HOSPADM

## 2024-12-27 RX ORDER — CITRIC ACID/SODIUM CITRATE 334-500MG
30 SOLUTION, ORAL ORAL
Status: COMPLETED | OUTPATIENT
Start: 2024-12-27 | End: 2024-12-27

## 2024-12-27 RX ORDER — OXYTOCIN/0.9 % SODIUM CHLORIDE 30/500 ML
340 PLASTIC BAG, INJECTION (ML) INTRAVENOUS CONTINUOUS PRN
Status: DISCONTINUED | OUTPATIENT
Start: 2024-12-27 | End: 2024-12-29 | Stop reason: HOSPADM

## 2024-12-27 RX ORDER — ONDANSETRON 4 MG/1
4 TABLET, ORALLY DISINTEGRATING ORAL EVERY 6 HOURS PRN
Status: DISCONTINUED | OUTPATIENT
Start: 2024-12-27 | End: 2024-12-27 | Stop reason: HOSPADM

## 2024-12-27 RX ORDER — LOPERAMIDE HYDROCHLORIDE 2 MG/1
4 CAPSULE ORAL
Status: DISCONTINUED | OUTPATIENT
Start: 2024-12-27 | End: 2024-12-27 | Stop reason: HOSPADM

## 2024-12-27 RX ORDER — OXYTOCIN/0.9 % SODIUM CHLORIDE 30/500 ML
340 PLASTIC BAG, INJECTION (ML) INTRAVENOUS CONTINUOUS PRN
Status: DISCONTINUED | OUTPATIENT
Start: 2024-12-27 | End: 2024-12-27 | Stop reason: HOSPADM

## 2024-12-27 RX ORDER — KETOROLAC TROMETHAMINE 30 MG/ML
30 INJECTION, SOLUTION INTRAMUSCULAR; INTRAVENOUS EVERY 6 HOURS
Status: COMPLETED | OUTPATIENT
Start: 2024-12-27 | End: 2024-12-28

## 2024-12-27 RX ORDER — LIDOCAINE 40 MG/G
CREAM TOPICAL
Status: DISCONTINUED | OUTPATIENT
Start: 2024-12-27 | End: 2024-12-27 | Stop reason: HOSPADM

## 2024-12-27 RX ORDER — CEFAZOLIN SODIUM/WATER 2 G/20 ML
2 SYRINGE (ML) INTRAVENOUS SEE ADMIN INSTRUCTIONS
Status: DISCONTINUED | OUTPATIENT
Start: 2024-12-27 | End: 2024-12-27 | Stop reason: HOSPADM

## 2024-12-27 RX ORDER — MISOPROSTOL 200 UG/1
400 TABLET ORAL
Status: DISCONTINUED | OUTPATIENT
Start: 2024-12-27 | End: 2024-12-29 | Stop reason: HOSPADM

## 2024-12-27 RX ORDER — OXYTOCIN/0.9 % SODIUM CHLORIDE 30/500 ML
100-340 PLASTIC BAG, INJECTION (ML) INTRAVENOUS CONTINUOUS PRN
Status: DISCONTINUED | OUTPATIENT
Start: 2024-12-27 | End: 2024-12-28

## 2024-12-27 RX ORDER — AMOXICILLIN 250 MG
1 CAPSULE ORAL 2 TIMES DAILY
Status: DISCONTINUED | OUTPATIENT
Start: 2024-12-27 | End: 2024-12-29 | Stop reason: HOSPADM

## 2024-12-27 RX ORDER — MODIFIED LANOLIN
OINTMENT (GRAM) TOPICAL
Status: DISCONTINUED | OUTPATIENT
Start: 2024-12-27 | End: 2024-12-29 | Stop reason: HOSPADM

## 2024-12-27 RX ORDER — METOCLOPRAMIDE 10 MG/1
10 TABLET ORAL EVERY 6 HOURS PRN
Status: DISCONTINUED | OUTPATIENT
Start: 2024-12-27 | End: 2024-12-27 | Stop reason: HOSPADM

## 2024-12-27 RX ORDER — METHYLERGONOVINE MALEATE 0.2 MG/ML
200 INJECTION INTRAVENOUS
Status: DISCONTINUED | OUTPATIENT
Start: 2024-12-27 | End: 2024-12-29 | Stop reason: HOSPADM

## 2024-12-27 RX ORDER — METHYLERGONOVINE MALEATE 0.2 MG/ML
200 INJECTION INTRAVENOUS
Status: DISCONTINUED | OUTPATIENT
Start: 2024-12-27 | End: 2024-12-27 | Stop reason: HOSPADM

## 2024-12-27 RX ORDER — CEFAZOLIN SODIUM/WATER 2 G/20 ML
SYRINGE (ML) INTRAVENOUS
Status: DISCONTINUED
Start: 2024-12-27 | End: 2024-12-27 | Stop reason: HOSPADM

## 2024-12-27 RX ORDER — URSODIOL 250 MG/1
500 TABLET, FILM COATED ORAL 2 TIMES DAILY
Status: DISCONTINUED | OUTPATIENT
Start: 2024-12-27 | End: 2024-12-29 | Stop reason: HOSPADM

## 2024-12-27 RX ORDER — FENTANYL CITRATE-0.9 % NACL/PF 10 MCG/ML
100 PLASTIC BAG, INJECTION (ML) INTRAVENOUS EVERY 5 MIN PRN
Status: DISCONTINUED | OUTPATIENT
Start: 2024-12-27 | End: 2024-12-27 | Stop reason: HOSPADM

## 2024-12-27 RX ORDER — OXYCODONE HYDROCHLORIDE 5 MG/1
5 TABLET ORAL EVERY 4 HOURS PRN
Status: DISCONTINUED | OUTPATIENT
Start: 2024-12-27 | End: 2024-12-29 | Stop reason: HOSPADM

## 2024-12-27 RX ORDER — SIMETHICONE 80 MG
80 TABLET,CHEWABLE ORAL 4 TIMES DAILY PRN
Status: DISCONTINUED | OUTPATIENT
Start: 2024-12-27 | End: 2024-12-29 | Stop reason: HOSPADM

## 2024-12-27 RX ORDER — TRANEXAMIC ACID 10 MG/ML
1 INJECTION, SOLUTION INTRAVENOUS EVERY 30 MIN PRN
Status: DISCONTINUED | OUTPATIENT
Start: 2024-12-27 | End: 2024-12-29 | Stop reason: HOSPADM

## 2024-12-27 RX ORDER — MISOPROSTOL 200 UG/1
800 TABLET ORAL
Status: DISCONTINUED | OUTPATIENT
Start: 2024-12-27 | End: 2024-12-27 | Stop reason: HOSPADM

## 2024-12-27 RX ORDER — BISACODYL 10 MG
10 SUPPOSITORY, RECTAL RECTAL DAILY PRN
Status: DISCONTINUED | OUTPATIENT
Start: 2024-12-29 | End: 2024-12-29 | Stop reason: HOSPADM

## 2024-12-27 RX ORDER — TRANEXAMIC ACID 10 MG/ML
1 INJECTION, SOLUTION INTRAVENOUS EVERY 30 MIN PRN
Status: DISCONTINUED | OUTPATIENT
Start: 2024-12-27 | End: 2024-12-27 | Stop reason: HOSPADM

## 2024-12-27 RX ORDER — OXYTOCIN 10 [USP'U]/ML
10 INJECTION, SOLUTION INTRAMUSCULAR; INTRAVENOUS
Status: DISCONTINUED | OUTPATIENT
Start: 2024-12-27 | End: 2024-12-29 | Stop reason: HOSPADM

## 2024-12-27 RX ORDER — LOPERAMIDE HYDROCHLORIDE 2 MG/1
4 CAPSULE ORAL
Status: DISCONTINUED | OUTPATIENT
Start: 2024-12-27 | End: 2024-12-29 | Stop reason: HOSPADM

## 2024-12-27 RX ORDER — CEFAZOLIN SODIUM/WATER 2 G/20 ML
2 SYRINGE (ML) INTRAVENOUS
Status: DISCONTINUED | OUTPATIENT
Start: 2024-12-27 | End: 2024-12-27 | Stop reason: HOSPADM

## 2024-12-27 RX ORDER — NALBUPHINE HYDROCHLORIDE 10 MG/ML
2.5-5 INJECTION INTRAMUSCULAR; INTRAVENOUS; SUBCUTANEOUS EVERY 6 HOURS PRN
Status: DISCONTINUED | OUTPATIENT
Start: 2024-12-27 | End: 2024-12-29 | Stop reason: HOSPADM

## 2024-12-27 RX ORDER — SODIUM CHLORIDE, SODIUM LACTATE, POTASSIUM CHLORIDE, CALCIUM CHLORIDE 600; 310; 30; 20 MG/100ML; MG/100ML; MG/100ML; MG/100ML
INJECTION, SOLUTION INTRAVENOUS CONTINUOUS
Status: DISCONTINUED | OUTPATIENT
Start: 2024-12-27 | End: 2024-12-27 | Stop reason: HOSPADM

## 2024-12-27 RX ORDER — IBUPROFEN 400 MG/1
800 TABLET, FILM COATED ORAL EVERY 6 HOURS
Status: DISCONTINUED | OUTPATIENT
Start: 2024-12-28 | End: 2024-12-29 | Stop reason: HOSPADM

## 2024-12-27 RX ORDER — URSODIOL 500 MG/1
500 TABLET, FILM COATED ORAL 2 TIMES DAILY
Status: ON HOLD | COMMUNITY
End: 2024-12-29

## 2024-12-27 RX ORDER — CARBOPROST TROMETHAMINE 250 UG/ML
250 INJECTION, SOLUTION INTRAMUSCULAR
Status: DISCONTINUED | OUTPATIENT
Start: 2024-12-27 | End: 2024-12-27 | Stop reason: HOSPADM

## 2024-12-27 RX ORDER — ONDANSETRON 2 MG/ML
4 INJECTION INTRAMUSCULAR; INTRAVENOUS EVERY 6 HOURS PRN
Status: DISCONTINUED | OUTPATIENT
Start: 2024-12-27 | End: 2024-12-29 | Stop reason: HOSPADM

## 2024-12-27 RX ORDER — LOPERAMIDE HYDROCHLORIDE 2 MG/1
2 CAPSULE ORAL
Status: DISCONTINUED | OUTPATIENT
Start: 2024-12-27 | End: 2024-12-27 | Stop reason: HOSPADM

## 2024-12-27 RX ORDER — ACETAMINOPHEN 325 MG/1
975 TABLET ORAL ONCE
Status: COMPLETED | OUTPATIENT
Start: 2024-12-27 | End: 2024-12-27

## 2024-12-27 RX ORDER — ONDANSETRON 2 MG/ML
4 INJECTION INTRAMUSCULAR; INTRAVENOUS EVERY 6 HOURS PRN
Status: DISCONTINUED | OUTPATIENT
Start: 2024-12-27 | End: 2024-12-27 | Stop reason: HOSPADM

## 2024-12-27 RX ORDER — PROCHLORPERAZINE MALEATE 10 MG
10 TABLET ORAL EVERY 6 HOURS PRN
Status: DISCONTINUED | OUTPATIENT
Start: 2024-12-27 | End: 2024-12-29 | Stop reason: HOSPADM

## 2024-12-27 RX ORDER — ACETAMINOPHEN 325 MG/1
975 TABLET ORAL EVERY 6 HOURS
Status: DISCONTINUED | OUTPATIENT
Start: 2024-12-27 | End: 2024-12-29 | Stop reason: HOSPADM

## 2024-12-27 RX ORDER — DEXTROSE, SODIUM CHLORIDE, SODIUM LACTATE, POTASSIUM CHLORIDE, AND CALCIUM CHLORIDE 5; .6; .31; .03; .02 G/100ML; G/100ML; G/100ML; G/100ML; G/100ML
INJECTION, SOLUTION INTRAVENOUS CONTINUOUS
Status: DISCONTINUED | OUTPATIENT
Start: 2024-12-27 | End: 2024-12-29 | Stop reason: HOSPADM

## 2024-12-27 RX ORDER — MISOPROSTOL 200 UG/1
400 TABLET ORAL
Status: DISCONTINUED | OUTPATIENT
Start: 2024-12-27 | End: 2024-12-27 | Stop reason: HOSPADM

## 2024-12-27 RX ORDER — OXYTOCIN 10 [USP'U]/ML
10 INJECTION, SOLUTION INTRAMUSCULAR; INTRAVENOUS
Status: DISCONTINUED | OUTPATIENT
Start: 2024-12-27 | End: 2024-12-27 | Stop reason: HOSPADM

## 2024-12-27 RX ORDER — MISOPROSTOL 200 UG/1
800 TABLET ORAL
Status: DISCONTINUED | OUTPATIENT
Start: 2024-12-27 | End: 2024-12-29 | Stop reason: HOSPADM

## 2024-12-27 RX ORDER — AMOXICILLIN 250 MG
2 CAPSULE ORAL 2 TIMES DAILY
Status: DISCONTINUED | OUTPATIENT
Start: 2024-12-27 | End: 2024-12-29 | Stop reason: HOSPADM

## 2024-12-27 RX ORDER — LIDOCAINE 40 MG/G
CREAM TOPICAL
Status: DISCONTINUED | OUTPATIENT
Start: 2024-12-27 | End: 2024-12-29 | Stop reason: HOSPADM

## 2024-12-27 RX ORDER — METOCLOPRAMIDE HYDROCHLORIDE 5 MG/ML
10 INJECTION INTRAMUSCULAR; INTRAVENOUS EVERY 6 HOURS PRN
Status: DISCONTINUED | OUTPATIENT
Start: 2024-12-27 | End: 2024-12-27 | Stop reason: HOSPADM

## 2024-12-27 RX ORDER — HYDROMORPHONE HYDROCHLORIDE 1 MG/ML
.3-.5 INJECTION, SOLUTION INTRAMUSCULAR; INTRAVENOUS; SUBCUTANEOUS
Status: DISCONTINUED | OUTPATIENT
Start: 2024-12-27 | End: 2024-12-29 | Stop reason: HOSPADM

## 2024-12-27 RX ORDER — OXYTOCIN 10 [USP'U]/ML
10 INJECTION, SOLUTION INTRAMUSCULAR; INTRAVENOUS
Status: DISCONTINUED | OUTPATIENT
Start: 2024-12-27 | End: 2024-12-28

## 2024-12-27 RX ADMIN — SODIUM CHLORIDE, POTASSIUM CHLORIDE, SODIUM LACTATE AND CALCIUM CHLORIDE: 600; 310; 30; 20 INJECTION, SOLUTION INTRAVENOUS at 06:46

## 2024-12-27 RX ADMIN — SENNOSIDES AND DOCUSATE SODIUM 1 TABLET: 50; 8.6 TABLET ORAL at 14:12

## 2024-12-27 RX ADMIN — SODIUM CITRATE AND CITRIC ACID MONOHYDRATE 30 ML: 500; 334 SOLUTION ORAL at 06:46

## 2024-12-27 RX ADMIN — ONDANSETRON 4 MG: 4 TABLET, ORALLY DISINTEGRATING ORAL at 16:41

## 2024-12-27 RX ADMIN — KETOROLAC TROMETHAMINE 30 MG: 30 INJECTION, SOLUTION INTRAMUSCULAR at 16:14

## 2024-12-27 RX ADMIN — SENNOSIDES AND DOCUSATE SODIUM 1 TABLET: 50; 8.6 TABLET ORAL at 20:06

## 2024-12-27 RX ADMIN — SODIUM CHLORIDE, POTASSIUM CHLORIDE, SODIUM LACTATE AND CALCIUM CHLORIDE 1000 ML: 600; 310; 30; 20 INJECTION, SOLUTION INTRAVENOUS at 21:34

## 2024-12-27 RX ADMIN — URSODIOL 500 MG: 250 TABLET ORAL at 20:12

## 2024-12-27 RX ADMIN — METOCLOPRAMIDE 10 MG: 5 INJECTION, SOLUTION INTRAMUSCULAR; INTRAVENOUS at 12:29

## 2024-12-27 RX ADMIN — ACETAMINOPHEN 975 MG: 325 TABLET, FILM COATED ORAL at 14:12

## 2024-12-27 RX ADMIN — SODIUM CHLORIDE, POTASSIUM CHLORIDE, SODIUM LACTATE AND CALCIUM CHLORIDE 500 ML: 600; 310; 30; 20 INJECTION, SOLUTION INTRAVENOUS at 05:55

## 2024-12-27 RX ADMIN — URSODIOL 500 MG: 250 TABLET ORAL at 14:12

## 2024-12-27 RX ADMIN — ACETAMINOPHEN 975 MG: 325 TABLET, FILM COATED ORAL at 06:45

## 2024-12-27 RX ADMIN — KETOROLAC TROMETHAMINE 30 MG: 30 INJECTION, SOLUTION INTRAMUSCULAR at 22:23

## 2024-12-27 RX ADMIN — ACETAMINOPHEN 975 MG: 325 TABLET, FILM COATED ORAL at 20:06

## 2024-12-27 RX ADMIN — SODIUM CHLORIDE, SODIUM LACTATE, POTASSIUM CHLORIDE, CALCIUM CHLORIDE AND DEXTROSE MONOHYDRATE: 5; 600; 310; 30; 20 INJECTION, SOLUTION INTRAVENOUS at 12:06

## 2024-12-27 ASSESSMENT — ACTIVITIES OF DAILY LIVING (ADL)
ADLS_ACUITY_SCORE: 29
ADLS_ACUITY_SCORE: 29
ADLS_ACUITY_SCORE: 39
ADLS_ACUITY_SCORE: 29
ADLS_ACUITY_SCORE: 39
ADLS_ACUITY_SCORE: 29
ADLS_ACUITY_SCORE: 39
ADLS_ACUITY_SCORE: 29
ADLS_ACUITY_SCORE: 29
ADLS_ACUITY_SCORE: 39
ADLS_ACUITY_SCORE: 30
ADLS_ACUITY_SCORE: 39
ADLS_ACUITY_SCORE: 39
ADLS_ACUITY_SCORE: 29
ADLS_ACUITY_SCORE: 39

## 2024-12-27 NOTE — PROCEDURES
POST OPERATIVE NOTE   SECTION        Pre-Op Diagnosis: 39 weeks gestation, previous , elevated liver enzymes (suspected hepatic steatosis), history of HELLP prior pregnancy    Post-Op Diagnosis: same    Procedure: repeat low transverse     Surgeon:  Eliane Way MD  Assist: NAHID Rowan    Anesthesia: spinal    Complications: none    EBL: 810 cc    Findings:  Viable male infant  Weight pending  Apgars 8/9  Significant scar tissue noted in subcutaneous space and involving fascial layer  normal uterus/tubes/ovaries      .     TECHNIQUE   The patient was taken to the operating room where a spinal was placed without difficulty. She was placed on the operating table in the left lateral tilt position and prepped and draped in the normal sterile fashion. A Pfannenstiel skin incision was created with the scalpel. This was carried down to the level of the fascia. Significant scarring was noted within the subcutaneous layer, making it challenging to identify the fascia.   Once the fascia was identified, it was incised in the  midline and this was extended laterally with the Alonso scissors. The fascia was dissected superiorly and inferiorly off the underlying rectus muscles. The rectus muscles were  in the midline and the underlying parietal peritoneum was identified and entered. This was extended superiorly and inferiorly with good visualization of the bladder and the bladder blade was inserted. The bladder flap was created with the Metzenbaum scissors. The bladder blade was reinserted.     The low transverse uterine incision was created.  The amniotic fluid was clear.  After several unsuccessful attempts to deliver the vertex, a kiwi vacuum was attached to the flexion point, and the vertex was then easily delivered.   The rest of the infant was delivered without difficulty.  Infant vigorous.   The mouth and nose were bulb suctioned. Nuchal cord  was not present.  The umbilical cord was  clamped and cut after 60 seconds.    The infant was handed off to the waiting nursing staff. Cord blood was obtained. The placenta was removed with gentle traction.     The uterus was exteriorized and cleared of all clots and debris. The uterine incision was closed in 2 layers using 0 PDS  suture. Hemostasis was noted. The uterus was returned to the abdomen.  The abdomen was then closed in layers by first closing the fascia in a running fashion using looped 0 PDS suture. The subcutaneous space was irrigated and reapproximated using interrupted sutures  of 3-0 plain gut suture. The skin was closed with a running subcuticular suture of 4-0 monocryl.  The fundus was noted to be firm at the conclusion of the procedure.  The patient was taken to recovery in stable condition with no complications.     Eliane Way MD  DOS 12/27/24

## 2024-12-27 NOTE — PROVIDER NOTIFICATION
MD Notification    Notified Person: MD    Notified Person Name:  Papo    Notification Date/Time: 12/27 @ 12:55    Notification Interaction: pager    Purpose of Notification: Ursodiol- restart PTA med? With elevated liver enzymes- tylenol okay? Pt wondering. Thanks Brooke DEWITT [phone]    Orders Received: yes, restart Ursodiol and tylenol is okay.     Comments:

## 2024-12-27 NOTE — PROCEDURES
I assisted Dr. Way in today's scheduled . My assistance was needed for safe delivery of the infant and expeditious completion of the case. I provided retraction for adequate exposure and visualization of the operating field, hemostasis, fundal pressure for expeditious delivery and cut suture material. This case was complicated by scar tissue. Sandra Couch PA-C 8:43 AM

## 2024-12-27 NOTE — PROGRESS NOTES
Ifeoma and her  son were transferred to room 431 via cart. Report was given to Armen ORTEGA RN who will now assume care.

## 2024-12-28 LAB
GLUCOSE BLDC GLUCOMTR-MCNC: 117 MG/DL (ref 70–99)
HGB BLD-MCNC: 10.4 G/DL (ref 11.7–15.7)

## 2024-12-28 PROCEDURE — 85018 HEMOGLOBIN: CPT | Performed by: OBSTETRICS & GYNECOLOGY

## 2024-12-28 PROCEDURE — 250N000011 HC RX IP 250 OP 636: Performed by: OBSTETRICS & GYNECOLOGY

## 2024-12-28 PROCEDURE — 250N000013 HC RX MED GY IP 250 OP 250 PS 637: Performed by: SPECIALIST

## 2024-12-28 PROCEDURE — 120N000012 HC R&B POSTPARTUM

## 2024-12-28 PROCEDURE — 250N000013 HC RX MED GY IP 250 OP 250 PS 637: Performed by: OBSTETRICS & GYNECOLOGY

## 2024-12-28 PROCEDURE — 36415 COLL VENOUS BLD VENIPUNCTURE: CPT | Performed by: OBSTETRICS & GYNECOLOGY

## 2024-12-28 RX ORDER — SERTRALINE HYDROCHLORIDE 25 MG/1
25 TABLET, FILM COATED ORAL DAILY
Status: DISCONTINUED | OUTPATIENT
Start: 2024-12-28 | End: 2024-12-29 | Stop reason: HOSPADM

## 2024-12-28 RX ORDER — LEVOTHYROXINE SODIUM 75 UG/1
75 TABLET ORAL
Status: DISCONTINUED | OUTPATIENT
Start: 2024-12-28 | End: 2024-12-29 | Stop reason: HOSPADM

## 2024-12-28 RX ADMIN — IBUPROFEN 800 MG: 400 TABLET, FILM COATED ORAL at 10:27

## 2024-12-28 RX ADMIN — URSODIOL 500 MG: 250 TABLET ORAL at 21:36

## 2024-12-28 RX ADMIN — ACETAMINOPHEN 975 MG: 325 TABLET, FILM COATED ORAL at 14:16

## 2024-12-28 RX ADMIN — SERTRALINE HYDROCHLORIDE 25 MG: 25 TABLET ORAL at 09:41

## 2024-12-28 RX ADMIN — SENNOSIDES AND DOCUSATE SODIUM 2 TABLET: 50; 8.6 TABLET ORAL at 20:08

## 2024-12-28 RX ADMIN — IBUPROFEN 800 MG: 400 TABLET, FILM COATED ORAL at 16:24

## 2024-12-28 RX ADMIN — ACETAMINOPHEN 975 MG: 325 TABLET, FILM COATED ORAL at 01:36

## 2024-12-28 RX ADMIN — IBUPROFEN 800 MG: 400 TABLET, FILM COATED ORAL at 22:51

## 2024-12-28 RX ADMIN — OXYCODONE HYDROCHLORIDE 5 MG: 5 TABLET ORAL at 18:30

## 2024-12-28 RX ADMIN — SIMETHICONE 80 MG: 80 TABLET, CHEWABLE ORAL at 04:43

## 2024-12-28 RX ADMIN — KETOROLAC TROMETHAMINE 30 MG: 30 INJECTION, SOLUTION INTRAMUSCULAR at 04:37

## 2024-12-28 RX ADMIN — ACETAMINOPHEN 975 MG: 325 TABLET, FILM COATED ORAL at 08:36

## 2024-12-28 RX ADMIN — SENNOSIDES AND DOCUSATE SODIUM 2 TABLET: 50; 8.6 TABLET ORAL at 08:36

## 2024-12-28 RX ADMIN — SIMETHICONE 80 MG: 80 TABLET, CHEWABLE ORAL at 20:08

## 2024-12-28 RX ADMIN — LEVOTHYROXINE SODIUM 75 MCG: 75 TABLET ORAL at 14:20

## 2024-12-28 RX ADMIN — ACETAMINOPHEN 975 MG: 325 TABLET, FILM COATED ORAL at 20:08

## 2024-12-28 RX ADMIN — OXYCODONE HYDROCHLORIDE 5 MG: 5 TABLET ORAL at 14:16

## 2024-12-28 RX ADMIN — URSODIOL 500 MG: 250 TABLET ORAL at 10:27

## 2024-12-28 ASSESSMENT — ACTIVITIES OF DAILY LIVING (ADL)
ADLS_ACUITY_SCORE: 38
ADLS_ACUITY_SCORE: 38
ADLS_ACUITY_SCORE: 39
ADLS_ACUITY_SCORE: 31
ADLS_ACUITY_SCORE: 38
ADLS_ACUITY_SCORE: 38
ADLS_ACUITY_SCORE: 39
ADLS_ACUITY_SCORE: 39
ADLS_ACUITY_SCORE: 38
ADLS_ACUITY_SCORE: 39
ADLS_ACUITY_SCORE: 38
ADLS_ACUITY_SCORE: 38
ADLS_ACUITY_SCORE: 39
ADLS_ACUITY_SCORE: 39
ADLS_ACUITY_SCORE: 38
ADLS_ACUITY_SCORE: 39

## 2024-12-28 NOTE — PLAN OF CARE
Goal Outcome Evaluation:      Plan of Care Reviewed With: patient    Overall Patient Progress: improvingOverall Patient Progress: improving     [id number]  Vitals: VSS   Pain: managed with tylenol, ibuprofen  Fundus: firm, midline   Lochia: scant   Incision: WDL  GI/: WDL, adequate voids,   Not passing gas - given simethicone

## 2024-12-28 NOTE — PROVIDER NOTIFICATION
12/27/24 2101   Provider Notification   Provider Name/Title Dr. Barros   Method of Notification Electronic Page   Request Evaluate-Remote   Notification Reason Status Update  (gerenberg out at 1530 unable to void, attempt x3 - bladder scan only showed 61ml. U-1 midline, IV fluids complete but a lot of emesis today, do you want a bolus?)     Plan:   1L LR bolus   If unable to void place greenberg and leave overnight

## 2024-12-28 NOTE — PROGRESS NOTES
POST PARTUM NOTE,  SECTION    POST-OP DAY 1:  Delivery    The patient feels well.  Had some nausea last night.  Pain is well controlled with current medications.   /69 (BP Location: Left arm)   Pulse 83   Temp 97.8  F (36.6  C) (Oral)   Resp 16   SpO2 96%   Breastfeeding Unknown ,     Hemoglobin   Date Value Ref Range Status   2024 11.9 11.7 - 15.7 g/dL Final       Intake/Output Summary (Last 24 hours) at 2024 0836  Last data filed at 2024 0136  Gross per 24 hour   Intake 3700 ml   Output 3290 ml   Net 410 ml     Elevated ALT.  Gestational diabetes    The amount and color of the lochia is appropriate for the duration of recovery.  The uterine fundus is firm, at umbilicus.   The incision is dry and intact.   Extremities are not edematous   The patient is ambulating well.  The patient is not yet tolerating a normal diet.  Flatus has not been passed.     AM blood sugar 117    Impression:   Active Problems:    Obesity    History of HELLP syndrome, currently pregnant    Diet controlled gestational diabetes mellitus (GDM)    Anxiety    Previous  delivery, antepartum condition or complication    Hypothyroidism    Elevated liver enzymes     delivery delivered    Labor and delivery, indication for care    Normal post-operative course.  GDM  Hypothyroidism    Plan:    Continue current plan  Will stop blood sugar checks  Repeat ALT tomorrow    Johana Hicks MD

## 2024-12-28 NOTE — PLAN OF CARE
Goal Outcome Evaluation:       Patient's vital signs are stable.  She is tolerating diet, ambulating and caring for the baby independently.  Adequate pain control with oral pain medications.  Incision is clean, dry and intact with steri-strips.  Pt is also taking oxycodone as needed for pain as well as scheduled Tylenol/Ibuprofen.  Pain goes up to 7 when transferring out of bed.  Patient is using ice packs which help.  Lochia is WNL.

## 2024-12-28 NOTE — LACTATION NOTE
Lactation visit with Ifeoma & baby boy Louis. She was working on feeding at time of visit and he was on right side, latched with nipple shield. She's noticed that his latch is pinchy at times. We worked on positioning, to try to get a more comfortable latch. Adjusted hold to bring him a little closer and we were able to get him latched deeply. Encouraged to keep working on positioning, reviewed how to position the shield and baby to get a deep latch with feeds. Encouraged Ifeoma to hold her breast with a U shape underneath breast as needed to help baby latch on.    Discussed cluster feeding, what it is and when to expect it, The Second Night, satiety cues, feeding cues, and reviewed Feeding Log for home use. Encouraged to review Breastfeeding section in Your Guide to Postpartum & [facility]. Louis supplemented once overnight last night with formula during cluster feeding; we discussed continuing to feed on demand, but if he's cluster feeding and seems to need supplementation, recommend breastfeeding first as she's been doing. Offered support and encouragement. Reviewed benefits of cluster feeding and timeline to mature milk production over first 3-5 days.    Feeding plan: Recommend unlimited, frequent breast feedings: At least 8 - 12 times every 24 hours. Avoid pacifiers and supplementation with formula unless medically indicated. Encouraged use of feeding log and to record feedings, and void/stool patterns. Ifeoma has a breast pump for home use. Reviewed outpatient lactation resources. Ifeoma very appreciative of visit.    Sabrina Odom, RN, BSN, MNN, IBCLC

## 2024-12-28 NOTE — LACTATION NOTE
Lactation visit with Ifeoma & baby boy. Ifeoma reports feeding is going well so far with a nipple shield, except for last feed where baby was sleepier. Last 3 glucose levels were WNL, checked due to Ifeoma's history of gestational diabetes, diet controlled. Ifeoma shared her last baby was born at 30 weeks, so it's been a different but great experience to start off breastfeeding and not needing to pump right from the beginning. She was a little concerned at this past feeding when baby was sleepy and didn't feed well. Offered reassurance and support and discussed normal feeding patterns. Reviewed continuing to work on feedings every 3 hours and recommended skin to skin and hand expression if baby is too sleepy to feed well. Discussed expected increase in feeding interest as baby is closer to 24 hours old.    Reviewed milk supply and engorgement. General questions answered regarding pumping, when it's helpful and necessary. Reviewed general recommendation to wait to start pumping until breastfeeding is well established unless there are feeding difficulties or engorgement not relieved by feeding baby or hand expression. Discussed introducing a bottle and recommendation to wait for bottle introduction for 3-4 weeks unless baby needs to supplement for medical reasons. Encouraged to review Breastfeeding section in Your Guide to Postpartum & [facility]. Discussed typical  feeding patterns, cluster feeding, and ways to wake a sleepy baby for feedings.    Feeding plan: Recommend unlimited, frequent breast feedings: At least 8 - 12 times every 24 hours. Avoid pacifiers and supplementation with formula unless medically indicated. Encouraged use of feeding log and to record feedings, and void/stool patterns. Ifeoma has a pump for home use.  Encouraged to call with needs, will revisit as needed. Ifeoma appreciative of visit.    Sabrina Odom, RN, BSN, MNN, IBCLC

## 2024-12-28 NOTE — PLAN OF CARE
Vital signs stable. Postpartum assessment WDL. Incision- UTV, pressure dressing clean/dry/intact. Pain controlled with tylenol and ibuprofen. Patient ambulating with stand by assistance. Zamora removed @ 1538, due to void by 1938 or bladder scan. Patient denies passing gas. Patient intermittently tolerating regular diet, x2 emesis- reglan and zofran given. PIV infusing 125mL/hr D5LR. Breastfeeding on cue with a shield, minimal assistance from staff required for positioning and latch verification. Patient and infant bonding well. Plan of care ongoing.

## 2024-12-29 VITALS
SYSTOLIC BLOOD PRESSURE: 130 MMHG | BODY MASS INDEX: 38.45 KG/M2 | HEART RATE: 86 BPM | OXYGEN SATURATION: 96 % | DIASTOLIC BLOOD PRESSURE: 69 MMHG | RESPIRATION RATE: 18 BRPM | TEMPERATURE: 97.5 F | WEIGHT: 224 LBS

## 2024-12-29 LAB — ALT SERPL W P-5'-P-CCNC: 43 U/L (ref 0–50)

## 2024-12-29 PROCEDURE — 250N000013 HC RX MED GY IP 250 OP 250 PS 637: Performed by: SPECIALIST

## 2024-12-29 PROCEDURE — 36415 COLL VENOUS BLD VENIPUNCTURE: CPT | Performed by: SPECIALIST

## 2024-12-29 PROCEDURE — 84460 ALANINE AMINO (ALT) (SGPT): CPT | Performed by: SPECIALIST

## 2024-12-29 PROCEDURE — 250N000013 HC RX MED GY IP 250 OP 250 PS 637: Performed by: OBSTETRICS & GYNECOLOGY

## 2024-12-29 RX ORDER — IBUPROFEN 200 MG
600 TABLET ORAL EVERY 6 HOURS PRN
Status: SHIPPED
Start: 2024-12-29

## 2024-12-29 RX ORDER — OXYCODONE HYDROCHLORIDE 5 MG/1
5 TABLET ORAL EVERY 4 HOURS PRN
Qty: 25 TABLET | Refills: 0 | Status: SHIPPED | OUTPATIENT
Start: 2024-12-29

## 2024-12-29 RX ORDER — ACETAMINOPHEN 325 MG/1
325-650 TABLET ORAL EVERY 6 HOURS
Status: SHIPPED
Start: 2024-12-29

## 2024-12-29 RX ADMIN — SERTRALINE HYDROCHLORIDE 25 MG: 25 TABLET ORAL at 08:37

## 2024-12-29 RX ADMIN — ACETAMINOPHEN 975 MG: 325 TABLET, FILM COATED ORAL at 08:37

## 2024-12-29 RX ADMIN — OXYCODONE HYDROCHLORIDE 5 MG: 5 TABLET ORAL at 11:00

## 2024-12-29 RX ADMIN — IBUPROFEN 800 MG: 400 TABLET, FILM COATED ORAL at 10:52

## 2024-12-29 RX ADMIN — URSODIOL 500 MG: 250 TABLET ORAL at 08:37

## 2024-12-29 RX ADMIN — IBUPROFEN 800 MG: 400 TABLET, FILM COATED ORAL at 05:29

## 2024-12-29 RX ADMIN — ACETAMINOPHEN 975 MG: 325 TABLET, FILM COATED ORAL at 01:48

## 2024-12-29 RX ADMIN — LEVOTHYROXINE SODIUM 75 MCG: 75 TABLET ORAL at 06:23

## 2024-12-29 RX ADMIN — SENNOSIDES AND DOCUSATE SODIUM 1 TABLET: 50; 8.6 TABLET ORAL at 08:37

## 2024-12-29 ASSESSMENT — ACTIVITIES OF DAILY LIVING (ADL)
ADLS_ACUITY_SCORE: 31

## 2024-12-29 NOTE — DISCHARGE SUMMARY
OB DISCHARGE SUMMARY    Pregnancy at 39w2d weeks. ,   ADMITTING DIAGNOSIS: term pregnancy, h/o  desires repeat.  DISCHARGE DIAGNOSIS: s/p repeat  delivery.  Hemoglobin   Date Value Ref Range Status   2024 10.4 (L) 11.7 - 15.7 g/dL Final       Past Medical History:   Diagnosis Date    Depressive disorder     Anxiety     Active Problems:    Obesity    History of HELLP syndrome, currently pregnant    Diet controlled gestational diabetes mellitus (GDM)    Anxiety    Previous  delivery, antepartum condition or complication    Hypothyroidism    Elevated liver enzymes     delivery delivered    Labor and delivery, indication for care      PREGNANCY COMPLICATIONS: hypothyroid, GDM, elevated liver enzymes, h/o HELLP.  PROCEDURES:  delivery.  POSTPARTUM COURSE: routine    ACTIVITIES:  Daily activities for the first week should be limited to taking care of yourself and your baby.  Nothing in vagina for 6 weeks.  No heavy lifting X 6 weeks, no driving x 2 weeks.    DISCHARGE DIET:  Regular  Current Discharge Medication List        START taking these medications    Details   acetaminophen (TYLENOL) 325 MG tablet Take 1-2 tablets (325-650 mg) by mouth every 6 hours.    Associated Diagnoses:  delivery delivered      ibuprofen (ADVIL/MOTRIN) 200 MG tablet Take 3 tablets (600 mg) by mouth every 6 hours as needed for moderate pain.    Associated Diagnoses:  delivery delivered      oxyCODONE (ROXICODONE) 5 MG tablet Take 1 tablet (5 mg) by mouth every 4 hours as needed.  Qty: 25 tablet, Refills: 0    Associated Diagnoses:  delivery delivered           CONTINUE these medications which have NOT CHANGED    Details   docusate sodium (COLACE) 100 MG capsule Take 100 mg by mouth 2 times daily.      levothyroxine (SYNTHROID/LEVOTHROID) 75 MCG tablet Take 1 tablet (75 mcg) by mouth daily.  Qty: 90 tablet, Refills: 0    Associated Diagnoses: Hypothyroidism, unspecified  type      Prenatal Vit-Fe Fumarate-FA (PRENATAL MULTIVITAMIN W/IRON) 27-0.8 MG tablet Take 1 tablet by mouth daily      sertraline (ZOLOFT) 25 MG tablet Take 25 mg by mouth daily.           STOP taking these medications       aspirin 81 MG EC tablet Comments:   Reason for Stopping:         ferrous sulfate (FEROSUL) 325 (65 Fe) MG tablet Comments:   Reason for Stopping:         ursodiol (ACTIGALL) 500 MG tablet Comments:   Reason for Stopping:             FOLLOWUP: 2 and 6 weeks.    Johana Hicks MD

## 2024-12-29 NOTE — PLAN OF CARE
VSS   Incision CDI. Incisional pain well controlled with Tylenol, Ibuprofen &  Oxycodone tonight.Also using abdominal binder.   Ibeth reg diet.  Voiding adequate amounts.      breast feeds going well with help to latch and position using nipple shield.  .Goal Outcome Evaluation:      Plan of Care Reviewed With: patient, spouse    Overall Patient Progress: improvingOverall Patient Progress: improving

## 2024-12-29 NOTE — PLAN OF CARE
Vital signs stable. Postpartum assessment WDL. Incision clean, dry and intact with steri strips. Pain controlled with tylenol and ibuprofen, PRN ice packs to incision and heat packs to abdomen for cramping, abdominal binder on for comfort. Patient ambulating independently, free of dizziness or lightheadedness. Voiding spontaneously without difficulty. Patient reports passing gas, had bowel movement overnight, simethicone administered for intermittent gas pains. Breastfeeding with nipple shield on cue with no assist and supplementing infant with expressed breast milk via cup feeding as needed. Patient and infant bonding well. Will continue with current plan of care.     Goal Outcome Evaluation:      Plan of Care Reviewed With: patient, spouse    Overall Patient Progress: improving

## 2024-12-29 NOTE — LACTATION NOTE
Follow up Lactation visit with Ifeoma, significant other Marcelo & baby boy Louis. Getting ready for discharge. Ifeoma reports feeding is going well and shared she feels like her milk is starting to come in. She also shared she was able to stop using the nipple shield and Louis is latching well. At time of visit, he was feeding on left side, in cross cradle hold with a deep latch noted.  Discussed cluster feeding, what it is and when to expect it, The Second Night, satiety cues, feeding cues, and reviewed Feeding Log for home use. Encouraged to review Breastfeeding section in Your Guide to Postpartum &  Care.    Reviewed milk supply and engorgement. Reviewed typical timeline of milk supply initiation and progression over first 3-5 days postpartum. Discussed comfort measures for engorgement, plugged duct treatment, and warning signs of breast infection. General questions answered regarding pumping, when it's helpful and necessary. Reviewed general recommendation to wait to start pumping until breastfeeding is well established unless there are feeding difficulties or engorgement not relieved by feeding baby or hand expression. Discussed introducing a bottle and recommendation to wait for bottle introduction for 3-4 weeks unless baby needs to supplement for medical reasons.    Feeding plan: Recommend unlimited, frequent breast feedings: At least 8 - 12 times every 24 hours. Avoid pacifiers and supplementation with formula unless medically indicated. Encouraged use of feeding log and to record feedings, and void/stool patterns. Ifeoma has a breast pump for home use. Follow up with Alda Kolb. Reviewed outpatient lactation resources. Ifeoma & Marcelo very appreciative of visit.    Sabrina Odom, RN, BSN, MNN, IBCLC

## 2024-12-29 NOTE — DISCHARGE INSTRUCTIONS
Warning Signs after Having a Baby    Keep this paper on your fridge or somewhere else where you can see it.    Call your provider if you have any of these symptoms up to 12 weeks after having your baby.    Thoughts of hurting yourself or your baby  Pain in your chest or trouble breathing  Severe headache not helped by pain medicine  Eyesight concerns (blurry vision, seeing spots or flashes of light, other changes to eyesight)  Fainting, shaking or other signs of a seizure    Call 9-1-1 if you feel that it is an emergency.     The symptoms below can happen to anyone after giving birth. They can be very serious. Call your provider if you have any of these warning signs.    My provider s phone number: _______________________    Losing too much blood (hemorrhage)    Call your provider if you soak through a pad in less than an hour or pass blood clots bigger than a golf ball. These may be signs that you are bleeding too much.    Blood clots in the legs or lungs    After you give birth, your body naturally clots its blood to help prevent blood loss. Sometimes this increased clotting can happen in other areas of the body, like the legs or lungs. This can block your blood flow and be very dangerous.     Call your provider if you:  Have a red, swollen spot on the back of your leg that is warm or painful when you touch it.   Are coughing up blood.     Infection    Call your provider if you have any of these symptoms:  Fever of 100.4 F (38 C) or higher.  Pain or redness around your stitches if you had an incision.   Any yellow, white, or green fluid coming from places where you had stitches or surgery.    Mood Problems (postpartum depression)    Many people feel sad or have mood changes after having a baby. But for some people, these mood swings are worse.     Call your provider right away if you feel so anxious or nervous that you can't care for yourself or your baby.    Preeclampsia (high blood pressure)    Even if you  didn't have high blood pressure when you were pregnant, you are at risk for the high blood pressure disease called preeclampsia. This risk can last up to 12 weeks after giving birth.     Call your provider if you have:   Pain on your right side under your rib cage  Sudden swelling in the hands and face    Remember: You know your body. If something doesn't feel right, get medical help.     For informational purposes only. Not to replace the advice of your health care provider. Copyright 2020 [facility]. All rights reserved. Clinically reviewed by Moriah Claros, RNC-OB, MSN. [employer] 238607 - Rev .     Section: What to Expect at Home  Your Recovery     A  section, or , is surgery to deliver your baby through a cut that the doctor makes in your lower belly and uterus. The cut is called an incision.  You may have some pain in your lower belly and need pain medicine for 1 to 2 weeks. You can expect some vaginal bleeding for several weeks. You will probably need about 6 weeks to fully recover.  It's important to take it easy while the incision heals. Avoid heavy lifting, strenuous activities, and exercises that strain the belly muscles while you recover. Ask a family member or friend for help with housework, cooking, and shopping.  This care sheet gives you a general idea about how long it will take for you to recover. But each person recovers at a different pace. Follow the steps below to get better as quickly as possible.  How can you care for yourself at home?  Activity       Rest when you feel tired. Getting enough sleep will help you recover.        Try to walk each day. Start by walking a little more than you did the day before. Bit by bit, increase the amount you walk. Walking boosts blood flow and helps prevent pneumonia, constipation, and blood clots.        Avoid strenuous activities, such as bicycle riding, jogging, weightlifting, and aerobic exercise, for 6 weeks  or until your doctor says it is okay.        Until your doctor says it is okay, do not lift anything heavier than your baby.        Do not do sit-ups or other exercises that strain the belly muscles for 6 weeks or until your doctor says it is okay.        Hold a pillow over your incision when you cough or take deep breaths. This will support your belly and decrease your pain.        You may shower as usual. Pat the incision dry when you are done.        You will have some vaginal bleeding. Wear sanitary pads. Do not douche or use tampons until your doctor says it is okay.        Ask your doctor when you can drive again.        You will probably need to take at least 6 weeks off work. It depends on the type of work you do and how you feel.        Ask your doctor when it is okay for you to have sex.   Diet       You can eat your normal diet. If your stomach is upset, try bland, low-fat foods like plain rice, broiled chicken, toast, and yogurt.        Drink plenty of fluids (unless your doctor tells you not to).        You may notice that your bowel movements are not regular right after your surgery. This is common. Try to avoid constipation and straining with bowel movements. You may want to take a fiber supplement every day. If you have not had a bowel movement after a couple of days, ask your doctor about taking a mild laxative.        If you are breastfeeding, limit alcohol. Alcohol can cause a lack of energy and other health problems for the baby when a breastfeeding woman drinks heavily. It can also get in the way of a mom's ability to feed her baby or to care for the child in other ways. There isn't a lot of research about exactly how much alcohol can harm a baby. Having no alcohol is the safest choice for your baby. If you choose to have a drink now and then, have only one drink, and limit the number of occasions that you have a drink. Wait to breastfeed at least 2 hours after you have a drink to reduce the  amount of alcohol the baby may get in the milk.   Medicines       Your doctor will tell you if and when you can restart your medicines. You will also get instructions about taking any new medicines.        If you stopped taking aspirin or some other blood thinner, your doctor will tell you when to start taking it again.        Take pain medicines exactly as directed.  If the doctor gave you a prescription medicine for pain, take it as prescribed.  If you are not taking a prescription pain medicine, ask your doctor if you can take an over-the-counter medicine.        If you think your pain medicine is making you sick to your stomach:  Take your medicine after meals (unless your doctor has told you not to).  Ask your doctor for a different pain medicine.        If your doctor prescribed antibiotics, take them as directed. Do not stop taking them just because you feel better. You need to take the full course of antibiotics.   Incision care       If you have strips of tape on the incision, leave the tape on for a week or until it falls off.        Wash the area daily with warm, soapy water, and pat it dry. Don't use hydrogen peroxide or alcohol, which can slow healing. You may cover the area with a gauze bandage if it weeps or rubs against clothing. Change the bandage every day.        Keep the area clean and dry.   Other instructions       If you breastfeed your baby, you may be more comfortable while you are healing if you don't rest your baby on your belly. Try tucking your baby under your arm, with your baby's body along the side you will be feeding on. Support your baby's upper body with your arm. With that hand you can control your baby's head to bring your baby's mouth to your breast. This is sometimes called the football hold.   Follow-up care is a key part of your treatment and safety. Be sure to make and go to all appointments, and call your doctor if you are having problems. It's also a good idea to know your  test results and keep a list of the medicines you take.  When should you call for help?  Share this information with your partner, family, or a friend. They can help you watch for warning signs.  Call 911  anytime you think you may need emergency care. For example, call if:       You feel you cannot stop from hurting yourself, your baby, or someone else.        You passed out (lost consciousness).        You have chest pain, are short of breath, or cough up blood.        You have a seizure.   Where to get help 24 hours a day, 7 days a week   If you or someone you know talks about suicide, self-harm, a mental health crisis, a substance use crisis, or any other kind of emotional distress, get help right away. You can:       Call the Suicide and Crisis Lifeline at 988.        Call 2-583-491-TALK (1-822.935.1766).        Text HOME to 181254 to access the Crisis Text Line.   Consider saving these numbers in your phone.  Go to [employer] for more information or to chat online.  Call your doctor or midwife now or seek immediate medical care if:       You have loose stitches, or your incision comes open.        You have signs of hemorrhage (too much bleeding), such as:  Heavy vaginal bleeding. This means that you are soaking through one or more pads in an hour. Or you pass blood clots bigger than an egg.  Feeling dizzy or lightheaded, or you feel like you may faint.  Feeling so tired or weak that you cannot do your usual activities.  A fast or irregular heartbeat.  New or worse belly pain.        You have symptoms of infection, such as:  Increased pain, swelling, warmth, or redness.  Red streaks leading from the incision.  Pus draining from the incision.  A fever.  Frequent or painful urination or blood in your urine.  Vaginal discharge that smells bad.  New or worse belly pain.        You have symptoms of a blood clot in your leg (called a deep vein thrombosis), such as:  Pain in the calf, back of the knee, thigh, or  "groin.  Swelling in the leg or groin.  A color change on the leg or groin. The skin may be reddish or purplish, depending on your usual skin color.        You have signs of preeclampsia, such as:  Sudden swelling of your face, hands, or feet.  New vision problems (such as dimness, blurring, or seeing spots).  A severe headache.        You have signs of heart failure, such as:  New or increased shortness of breath.  New or worse swelling in your legs, ankles, or feet.  Sudden weight gain, such as more than 2 to 3 pounds in a day or 5 pounds in a week.  Feeling so tired or weak that you cannot do your usual activities.        You had spinal or epidural pain relief and have:  New or worse back pain.  Increased pain, swelling, warmth, or redness at the injection site.  Tingling, weakness, or numbness in your legs or groin.   Watch closely for changes in your health, and be sure to contact your doctor or midwife if:       Your vaginal bleeding isn't decreasing.        You feel sad, anxious, or hopeless for more than a few days.        You are having problems with your breasts or breastfeeding.   Where can you learn more?  Go to https://www.[employer].net/patiented  Enter M806 in the search box to learn more about \" Section: What to Expect at Home.\"  Current as of: 2024  Content Version: 14.3    2024 [employer].   Care instructions adapted under license by your healthcare professional. If you have questions about a medical condition or this instruction, always ask your healthcare professional. [employer] disclaims any warranty or liability for your use of this information.    "

## 2024-12-29 NOTE — PLAN OF CARE
Goal Outcome Evaluation:       D: VSS, assessments WDL.   I: Pt. received complete discharge paperwork and home medications as filled by discharge pharmacy.  Pt. was given times of last dose for all discharge medications in writing on discharge medication sheets.  Discharge teaching included home medication, pain management, activity restrictions, postpartum cares, and signs and symptoms of infection.    A: Discharge outcomes on care plan met.  Mother states understanding and comfort with self and baby cares.  P: Pt. discharged to home.  Pt. was discharged with baby, and bands were checked at time of discharge.  Pt. was accompanied by , nurse and baby, and left with personal belongings.   Pt. to follow up with OB per MD order.  Pt. had no further questions at the time of discharge and no unmet needs were identified.

## 2024-12-29 NOTE — PROGRESS NOTES
POST PARTUM NOTE,  SECTION    POST-OP DAY 2:  Delivery    The patient feels well.   Pain is well controlled with current medications. Patient would like to go home today.   /66 (BP Location: Left arm, Patient Position: Semi-Urrutia's, Cuff Size: Adult Regular)   Pulse 79   Temp 97.8  F (36.6  C) (Oral)   Resp 16   SpO2 96%   Breastfeeding Unknown ,     Hemoglobin   Date Value Ref Range Status   2024 10.4 (L) 11.7 - 15.7 g/dL Final       Intake/Output Summary (Last 24 hours) at 2024 0760  Last data filed at 2024 1700  Gross per 24 hour   Intake 300 ml   Output --   Net 300 ml         The amount and color of the lochia is appropriate for the duration of recovery.  The uterine fundus is firm, at umbilicus.   The incision is dry and intact.   Extremities are not edematous   The patient is ambulating well.  The patient is tolerating a normal diet.  Flatus has been passed.     Impression:   Active Problems:    Obesity    History of HELLP syndrome, currently pregnant    Diet controlled gestational diabetes mellitus (GDM)    Anxiety    Previous  delivery, antepartum condition or complication    Hypothyroidism    Elevated liver enzymes     delivery delivered    Labor and delivery, indication for care     normal. Post-operative course.    Plan:  Continue current plan             Patient plans to go home today.    Johana Hicks MD ....................  2024   7:36 AM , pager: [phone]

## 2025-01-14 ENCOUNTER — TELEPHONE (OUTPATIENT)
Dept: ENDOCRINOLOGY | Facility: CLINIC | Age: 25
End: 2025-01-14
Payer: COMMERCIAL

## 2025-01-14 NOTE — TELEPHONE ENCOUNTER
Left Voicemail (1st Attempt) and Sent Mychart (1st Attempt) for the patient to call back and schedule the following:    Appointment type: RETURN ENDOCRINE  Provider: Kitty Hussein MD  Return date: On or around 07/16/25  Specialty phone number: [phone]  Additional appointment(s) needed: Labs February/March 2025  Additonal Notes: LVM, [email or web address]    Disposition: Return in about 8 months (around 7/16/2025) for labs in 3-4 months. [CER [id number]]    Appts available in solutions.    Ifeoma Medellin on 1/14/2025 at 3:09 PM

## 2025-02-27 ENCOUNTER — LAB (OUTPATIENT)
Dept: LAB | Facility: CLINIC | Age: 25
End: 2025-02-27
Payer: COMMERCIAL

## 2025-02-27 DIAGNOSIS — E06.3 HASHIMOTO'S THYROIDITIS: ICD-10-CM

## 2025-03-03 DIAGNOSIS — E03.9 HYPOTHYROIDISM, UNSPECIFIED TYPE: ICD-10-CM

## 2025-03-03 RX ORDER — LEVOTHYROXINE SODIUM 75 UG/1
75 TABLET ORAL DAILY
Qty: 90 TABLET | Refills: 0 | Status: SHIPPED | OUTPATIENT
Start: 2025-03-03 | End: 2025-03-04

## 2025-03-04 DIAGNOSIS — E03.9 HYPOTHYROIDISM, UNSPECIFIED TYPE: ICD-10-CM

## 2025-03-04 RX ORDER — LEVOTHYROXINE SODIUM 75 UG/1
75 TABLET ORAL DAILY
Qty: 90 TABLET | Refills: 2 | Status: SHIPPED | OUTPATIENT
Start: 2025-03-04

## 2025-07-01 ENCOUNTER — OFFICE VISIT (OUTPATIENT)
Dept: URGENT CARE | Facility: URGENT CARE | Age: 25
End: 2025-07-01
Payer: COMMERCIAL

## 2025-07-01 VITALS
HEART RATE: 92 BPM | OXYGEN SATURATION: 98 % | RESPIRATION RATE: 16 BRPM | BODY MASS INDEX: 38.93 KG/M2 | HEIGHT: 64 IN | SYSTOLIC BLOOD PRESSURE: 128 MMHG | DIASTOLIC BLOOD PRESSURE: 82 MMHG | WEIGHT: 228 LBS | TEMPERATURE: 97.7 F

## 2025-07-01 DIAGNOSIS — J01.90 ACUTE SINUSITIS WITH SYMPTOMS > 10 DAYS: Primary | ICD-10-CM

## 2025-07-01 PROCEDURE — 3079F DIAST BP 80-89 MM HG: CPT | Performed by: PHYSICIAN ASSISTANT

## 2025-07-01 PROCEDURE — 3074F SYST BP LT 130 MM HG: CPT | Performed by: PHYSICIAN ASSISTANT

## 2025-07-01 PROCEDURE — 99203 OFFICE O/P NEW LOW 30 MIN: CPT | Performed by: PHYSICIAN ASSISTANT

## 2025-07-01 RX ORDER — AMOXICILLIN 875 MG/1
875 TABLET, COATED ORAL 2 TIMES DAILY
Qty: 20 TABLET | Refills: 0 | Status: SHIPPED | OUTPATIENT
Start: 2025-07-01 | End: 2025-07-11

## 2025-07-01 NOTE — PROGRESS NOTES
SUBJECTIVE:  Ifeoma Parisi is a 25 year old female with a 3-week history of URI related symptoms.  Patient has had nasal congestion along with a dry cough.  Did have a sore throat but thinks it is due to the drainage.  Now over the past several days she is having increasing ear pain worse on the right.  No high fevers have been noted.  She denies any shortness of breath or chest pain.  She has not been taking any medication as she is unsure what she can take as she is currently breast-feeding a 6-month-old.  She is otherwise in normal state of good health with no other concerns.    Past Medical History:   Diagnosis Date    Depressive disorder     Anxiety     Patient Active Problem List   Diagnosis    Preeclampsia, severe, third trimester    Obesity    Indication for care in labor and delivery, antepartum    HELLP syndrome (HELLP), third trimester     delivery delivered    HELLP syndrome    Encounter for triage in pregnant patient    History of HELLP syndrome, currently pregnant    Diet controlled gestational diabetes mellitus (GDM)    Anxiety    Previous  delivery, antepartum condition or complication    Hypothyroidism    Elevated liver enzymes     delivery delivered    Labor and delivery, indication for care     Current Outpatient Medications   Medication Sig Dispense Refill    ibuprofen (ADVIL/MOTRIN) 200 MG tablet Take 3 tablets (600 mg) by mouth every 6 hours as needed for moderate pain.      levothyroxine (SYNTHROID/LEVOTHROID) 75 MCG tablet Take 1 tablet (75 mcg) by mouth daily. 90 tablet 2    Prenatal Vit-Fe Fumarate-FA (PRENATAL MULTIVITAMIN W/IRON) 27-0.8 MG tablet Take 1 tablet by mouth daily      sertraline (ZOLOFT) 25 MG tablet Take 25 mg by mouth daily.      acetaminophen (TYLENOL) 325 MG tablet Take 1-2 tablets (325-650 mg) by mouth every 6 hours. (Patient not taking: Reported on 2025)      docusate sodium (COLACE) 100 MG capsule Take 100 mg by mouth 2 times daily. (Patient  not taking: Reported on 7/1/2025)      oxyCODONE (ROXICODONE) 5 MG tablet Take 1 tablet (5 mg) by mouth every 4 hours as needed. 25 tablet 0     No current facility-administered medications for this visit.     Social History     Socioeconomic History    Marital status:      Spouse name: Not on file    Number of children: Not on file    Years of education: Not on file    Highest education level: Not on file   Occupational History    Not on file   Tobacco Use    Smoking status: Never    Smokeless tobacco: Never   Vaping Use    Vaping status: Never Used   Substance and Sexual Activity    Alcohol use: Not Currently    Drug use: Never    Sexual activity: Yes     Partners: Male   Other Topics Concern    Not on file   Social History Narrative    Not on file     Social Drivers of Health     Financial Resource Strain: Low Risk  (2/1/2025)    Received from [facility]    Financial Resource Strain     Difficulty of Paying Living Expenses: 3     Difficulty of Paying Living Expenses: Not on file   Food Insecurity: Low Risk  (12/27/2024)    Food Insecurity     Within the past 12 months, did you worry that your food would run out before you got money to buy more?: No     Within the past 12 months, did the food you bought just not last and you didn t have money to get more?: No   Transportation Needs: Low Risk  (12/27/2024)    Transportation Needs     Within the past 12 months, has lack of transportation kept you from medical appointments, getting your medicines, non-medical meetings or appointments, work, or from getting things that you need?: No   Physical Activity: Not on file   Stress: Not on file   Social Connections: Socially Integrated (10/22/2024)    Received from [facility]    Social Connections     Do you often feel lonely or isolated from those around you?: 0   Interpersonal Safety: Low Risk  (12/27/2024)    Interpersonal Safety     Do you feel  physically and emotionally safe where you currently live?: Yes     Within the past 12 months, have you been hit, slapped, kicked or otherwise physically hurt by someone?: No     Within the past 12 months, have you been humiliated or emotionally abused in other ways by your partner or ex-partner?: No   Housing Stability: Low Risk  (12/27/2024)    Housing Stability     Do you have housing? : Yes     Are you worried about losing your housing?: No     ROS  negative other than stated above    Exam:  GENERAL APPEARANCE: healthy, alert and no distress  EYES: EOMI,  PERRL  HENT: TMs fluid noted bilaterally.  Oral mucosa moist with no erythema or exudate noted.  Nasal congestion with postnasal drainage noted.  Maxillary sinus tenderness on the right  NECK: no adenopathy, no asymmetry, masses, or scars and thyroid normal to palpation  RESP: lungs clear to auscultation - no rales, rhonchi or wheezes  CV: regular rates and rhythm, normal S1 S2, no S3 or S4 and no murmur, click or rub -  SKIN: no suspicious lesions or rashes    assessment/plan:  (J01.90) Acute sinusitis with symptoms > 10 days  (primary encounter diagnosis)  Comment:   Plan: amoxicillin (AMOXIL) 875 MG tablet        Patient with 3-week history of URI related symptoms now with bilateral ear pain.  Does have fluid in the ear but no signs of infection.  Right-sided maxillary sinus tenderness consistent with sinusitis.  Will treat with amoxicillin.  Reassured this is safe during breast-feeding.  May use over-the-counter meds as discussed.  Ibuprofen and Tylenol as needed for pain.  Increase fluids hot packs to the face and steam.  Will follow-up as needed

## 2025-07-01 NOTE — PROGRESS NOTES
Urgent Care Clinic Visit    Chief Complaint   Patient presents with    Ear Problem     Bilateral ear pain, congestion, dry cough and some drainage giving her the ST  x 3 weeks -- took nothing for this               7/1/2025     5:09 PM   Additional Questions   Roomed by Javi   Accompanied by self

## 2025-08-11 ENCOUNTER — VIRTUAL VISIT (OUTPATIENT)
Dept: ENDOCRINOLOGY | Facility: CLINIC | Age: 25
End: 2025-08-11
Payer: COMMERCIAL

## 2025-08-11 VITALS — HEIGHT: 64 IN | WEIGHT: 230 LBS | BODY MASS INDEX: 39.27 KG/M2

## 2025-08-11 DIAGNOSIS — Z86.32 HISTORY OF GESTATIONAL DIABETES MELLITUS: ICD-10-CM

## 2025-08-11 DIAGNOSIS — E06.3 HASHIMOTO'S THYROIDITIS: Primary | ICD-10-CM

## 2025-08-11 DIAGNOSIS — E03.9 HYPOTHYROIDISM, UNSPECIFIED TYPE: ICD-10-CM

## 2025-08-11 PROCEDURE — 1126F AMNT PAIN NOTED NONE PRSNT: CPT | Mod: 95 | Performed by: INTERNAL MEDICINE

## 2025-08-11 PROCEDURE — 98006 SYNCH AUDIO-VIDEO EST MOD 30: CPT | Performed by: INTERNAL MEDICINE

## 2025-08-11 RX ORDER — LEVOTHYROXINE SODIUM 75 UG/1
75 TABLET ORAL DAILY
Qty: 90 TABLET | Refills: 3 | Status: SHIPPED | OUTPATIENT
Start: 2025-08-11

## 2025-08-11 ASSESSMENT — PAIN SCALES - GENERAL: PAINLEVEL_OUTOF10: NO PAIN (0)

## 2025-08-17 ENCOUNTER — LAB (OUTPATIENT)
Dept: LAB | Facility: CLINIC | Age: 25
End: 2025-08-17
Payer: COMMERCIAL

## 2025-08-17 DIAGNOSIS — E06.3 HASHIMOTO'S THYROIDITIS: ICD-10-CM

## 2025-08-17 PROCEDURE — 36415 COLL VENOUS BLD VENIPUNCTURE: CPT

## 2025-08-17 PROCEDURE — 84439 ASSAY OF FREE THYROXINE: CPT

## 2025-08-17 PROCEDURE — 84443 ASSAY THYROID STIM HORMONE: CPT

## 2025-08-18 LAB
T4 FREE SERPL-MCNC: 1.29 NG/DL (ref 0.9–1.7)
TSH SERPL DL<=0.005 MIU/L-ACNC: 5.89 UIU/ML (ref 0.3–4.2)

## (undated) DEVICE — LIGHT HANDLE X2

## (undated) DEVICE — SUCTION CANISTER MEDIVAC LINER 3000ML W/LID 65651-530

## (undated) DEVICE — PREP CHLORAPREP 26ML TINTED HI-LITE ORANGE 930815

## (undated) DEVICE — BLADE CLIPPER 4406

## (undated) DEVICE — SU VICRYL 3-0 CT-1 36" J338H

## (undated) DEVICE — SOL NACL 0.9% IRRIG 1000ML BOTTLE 07138-09

## (undated) DEVICE — DRAPE SHEET REV FOLD 3/4 9349

## (undated) DEVICE — CATH TRAY FOLEY 16FR BARDEX W/DRAIN BAG STATLOCK 300316A

## (undated) DEVICE — SU MONOCRYL 0 CTX 36" Y398H

## (undated) DEVICE — ESU GROUND PAD UNIVERSAL W/O CORD

## (undated) DEVICE — LINEN TOWEL PACK X5 5464

## (undated) DEVICE — STPL SKIN PROXIMATE 35 WIDE PMW35

## (undated) DEVICE — GLOVE PROTEXIS W/NEU-THERA 6.5  2D73TE65

## (undated) DEVICE — PAD CHUX UNDERPAD 23X24" 7136

## (undated) DEVICE — DRSG AQUACEL AG 3.5X9.75" HYDROFIBER 412011

## (undated) DEVICE — SOL WATER IRRIG 1000ML BOTTLE 07139-09

## (undated) DEVICE — PACK C-SECTION LF PL15OTA83B

## (undated) DEVICE — SU VICRYL 0 CT 36" J358H

## (undated) DEVICE — Device

## (undated) DEVICE — LINEN C-SECTION 5415

## (undated) DEVICE — DRSG KERLIX FLUFFS X5

## (undated) DEVICE — GLOVE PROTEXIS BLUE W/NEU-THERA 6.5  2D73EB65

## (undated) DEVICE — PREP CHLORAPREP 26ML TINTED ORANGE  260815

## (undated) DEVICE — PACK SET-UP STD 9102

## (undated) DEVICE — LINEN BABY BLANKET 5434

## (undated) DEVICE — SU VICRYL 0 CT-1 27" J340H

## (undated) RX ORDER — MORPHINE SULFATE 1 MG/ML
INJECTION, SOLUTION EPIDURAL; INTRATHECAL; INTRAVENOUS
Status: DISPENSED
Start: 2024-12-27

## (undated) RX ORDER — OXYTOCIN/0.9 % SODIUM CHLORIDE 30/500 ML
PLASTIC BAG, INJECTION (ML) INTRAVENOUS
Status: DISPENSED
Start: 2024-12-27

## (undated) RX ORDER — FENTANYL CITRATE 50 UG/ML
INJECTION, SOLUTION INTRAMUSCULAR; INTRAVENOUS
Status: DISPENSED
Start: 2023-05-24

## (undated) RX ORDER — ONDANSETRON 2 MG/ML
INJECTION INTRAMUSCULAR; INTRAVENOUS
Status: DISPENSED
Start: 2024-12-27

## (undated) RX ORDER — MORPHINE SULFATE 1 MG/ML
INJECTION, SOLUTION EPIDURAL; INTRATHECAL; INTRAVENOUS
Status: DISPENSED
Start: 2023-05-24

## (undated) RX ORDER — ONDANSETRON 2 MG/ML
INJECTION INTRAMUSCULAR; INTRAVENOUS
Status: DISPENSED
Start: 2023-05-24

## (undated) RX ORDER — EPHEDRINE SULFATE 50 MG/ML
INJECTION, SOLUTION INTRAMUSCULAR; INTRAVENOUS; SUBCUTANEOUS
Status: DISPENSED
Start: 2024-12-27

## (undated) RX ORDER — OXYTOCIN/0.9 % SODIUM CHLORIDE 30/500 ML
PLASTIC BAG, INJECTION (ML) INTRAVENOUS
Status: DISPENSED
Start: 2023-05-24